# Patient Record
Sex: MALE | Race: WHITE | Employment: UNEMPLOYED | URBAN - METROPOLITAN AREA
[De-identification: names, ages, dates, MRNs, and addresses within clinical notes are randomized per-mention and may not be internally consistent; named-entity substitution may affect disease eponyms.]

---
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
## 2021-03-30 ENCOUNTER — IMMUNIZATION (OUTPATIENT)
Dept: PRIMARY CARE CLINIC | Age: 21
End: 2021-03-30
Payer: COMMERCIAL

## 2021-03-30 PROCEDURE — 91300 COVID-19, PFIZER VACCINE 30MCG/0.3ML DOSE: CPT | Performed by: FAMILY MEDICINE

## 2021-03-30 PROCEDURE — 0001A COVID-19, PFIZER VACCINE 30MCG/0.3ML DOSE: CPT | Performed by: FAMILY MEDICINE

## 2021-04-16 ENCOUNTER — HOSPITAL ENCOUNTER (OUTPATIENT)
Age: 21
Discharge: HOME | End: 2021-04-16
Payer: COMMERCIAL

## 2021-04-16 PROCEDURE — 82941 ASSAY OF GASTRIN: CPT

## 2021-04-20 ENCOUNTER — IMMUNIZATION (OUTPATIENT)
Dept: PRIMARY CARE CLINIC | Age: 21
End: 2021-04-20
Payer: COMMERCIAL

## 2021-04-20 PROCEDURE — 91300 COVID-19, PFIZER VACCINE 30MCG/0.3ML DOSE: CPT | Performed by: FAMILY MEDICINE

## 2021-04-20 PROCEDURE — 0002A COVID-19, PFIZER VACCINE 30MCG/0.3ML DOSE: CPT | Performed by: FAMILY MEDICINE

## 2021-04-21 LAB — GASTRIN: 45 PG/ML (ref 0–100)

## 2022-02-22 ENCOUNTER — HOSPITAL ENCOUNTER (OUTPATIENT)
Dept: PHYSICAL THERAPY | Age: 22
Setting detail: THERAPIES SERIES
Discharge: HOME | End: 2022-02-22
Payer: COMMERCIAL

## 2022-02-22 PROCEDURE — 97161 PT EVAL LOW COMPLEX 20 MIN: CPT | Performed by: PHYSICAL THERAPIST

## 2022-02-22 PROCEDURE — 97110 THERAPEUTIC EXERCISES: CPT | Performed by: PHYSICAL THERAPIST

## 2022-02-22 PROCEDURE — 97112 NEUROMUSCULAR REEDUCATION: CPT | Performed by: PHYSICAL THERAPIST

## 2022-02-22 NOTE — PLAN OF CARE
[address],  [facility] [address] [facility], [address] [facility] ([facility] [address]  Phone: [phone], [phone]                                                    Physical Therapy Certification    Dear Referring Practitioner: Dr. Gila San,    We had the pleasure of evaluating the following patient for physical therapy services at [address] [place] [address]. A summary of our findings can be found in the initial assessment below. This includes our plan of care. If you have any questions or concerns regarding these findings, please do not hesitate to contact me at the office phone number checked above. Thank you for the referral.       Physician Signature:_______________________________Date:__________________  By signing above (or electronic signature), therapists plan is approved by physician      Patient: Linda Brasher   : 2000   MRN: [id number]  Referring Physician: Referring Practitioner: Dr. Gila San      Evaluation Date: 2022      Medical Diagnosis Information:  Diagnosis: Left Shoulder Labrum Repair / Replissage   Treatment Diagnosis: S43.00D                                         Insurance information: PT Insurance Information: Camilla    Precautions/ Contra-indications/Relevant Medical History: Hx of left shoulder labral repair in 2019 / Hx of seizures. C-SSRS Triggered by Intake questionnaire (Past 2 wk assessment):   [x] No, Questionnaire did not trigger screening.   [] Yes, Patient intake triggered further evaluation      [] C-SSRS Screening completed  [] PCP notified via Plan of Care  [] Emergency services notified     Latex Allergy:  [x]NO      []YES   Preferred Language for Healthcare:   [x]English       []other:    SUBJECTIVE: Patient stated complaint: Patient is s/p labral repair of his left shoulder by Dr. Gila San on 22.      Functional Disability Index: Quick Dash/Modified Oswestry: 55% (Total Number Sum: 35)    Pain Scale: 3/10  Easing factors: rest, sling use, oxycodone, ice  Provocative factors: Sleeping, showering, grasping, getting dressed     Type: [x]Constant   []Intermittent  []Radiating []Localized []other:     Numbness/Tingling: None    Functional Limitations/Impairments: [x]Lifting/reaching []Grooming [x]Carrying    [x]ADL's []Driving []Sports/Recreations   []Other:    Occupation/School:     Living Status/Prior Level of Function: Independent with ADLs and IADLs    OBJECTIVE:     ROM Left Right   Shoulder Flex 90 °  WNL ? Shoulder Abd     Shoulder ER 15 °     Shoulder IR     Elbow Flex     Elbow Ext     Wrist Flex     Wrist Ext     Strength  Left Right   Shoulder Flex NT ?  4+/5   Shoulder Scap  4+/5   Shoulder ER  4+/5   Shoulder IR     Elbow Flex     Elbow Ext     Wrist Flex     Wrist Ext     [place]        Reflexes/Sensation (myotomes/dermatomes):    []Normal    []Abnormal:      Joint mobility:    [x]Normal    []Hypo   []Hyper    Palpation/Observation: incisions are open to air, no drainage. Functional Mobility/Transfers: ind w/ sling (4 weeks)    Posture: WNL    Bandages/Dressings/Incisions: see above. Gait (include devices/WB status): WNL    Orthopedic Special Tests: NT    [x] Patient history, allergies, meds reviewed. Medical chart reviewed. See intake form. Review Of Systems (ROS):  [x]Performed Review of systems (Integumentary, CardioPulmonary, Neurological) by intake and observation. Intake form has been scanned into medical record. Patient has been instructed to contact their primary care physician regarding ROS issues if not already being addressed at this time.       Co-morbidities/Complexities (which will affect course of rehabilitation):   [x]None           Arthritic conditions   []Rheumatoid arthritis (M05.9)  []Osteoarthritis (M19.91)   Cardiovascular conditions   []Hypertension (I10)  []Hyperlipidemia (E78.5)  []Angina pectoris (I20)  []Atherosclerosis (I70) Musculoskeletal conditions   []Disc pathology   []Congenital spine pathologies   []Prior surgical intervention  []Osteoporosis (M81.8)  []Osteopenia (M85.8)   Endocrine conditions   []Hypothyroid (E03.9)  []Hyperthyroid Gastrointestinal conditions   []Constipation (H17.24)   Metabolic conditions   []Morbid obesity (E66.01)  []Diabetes type 1(E10.65) or 2 (E11.65)   []Neuropathy (G60.9)     Pulmonary conditions   []Asthma (J45)  []Coughing   []COPD (J44.9)   Psychological Disorders  []Anxiety (F41.9)  []Depression (F32.9)   []Other:   [x]Other:  Hx of left sided labral tear/repair. Barriers to/and or personal factors that will affect rehab potential:              []Age  []Sex              []Motivation/Lack of Motivation                        []Co-Morbidities              []Cognitive Function, education/learning barriers              []Environmental, home barriers              []profession/work barriers  []past PT/medical experience  []other:  Justification:    Falls Risk Assessment (30 days):  [x] Falls Risk assessed and no intervention required.   [] Falls Risk assessed and Patient requires intervention due to being higher risk   TUG score (>12s at risk):     [] Falls education provided, including     ASSESSMENT:   Functional Impairments   []Noted spinal or UE joint hypomobility   []Noted spinal or UE joint hypermobility   [x]Decreased UE functional ROM   [x]Decreased UE functional strength   []Abnormal reflexes/sensation/myotomal/dermatomal deficits   [x]Decreased RC/scapular/core strength and neuromuscular control   []other:      Functional Activity Limitations (from functional questionnaire and intake)   []Reduced ability to tolerate prolonged functional positions   []Reduced ability or difficulty with changes of positions or transfers between positions   []Reduced ability to maintain good posture and demonstrate good body mechanics with sitting, bending, and lifting   [] Reduced ability or tolerance with driving and/or computer work   [x]Reduced ability to sleep   [x]Reduced ability to perform lifting, reaching, carrying tasks   [x]Reduced ability to tolerate impact through UE   [x]Reduced ability to reach behind back   [x]Reduced ability to  or hold objects   [x]Reduced ability to throw or toss an object   []other:    Participation Restrictions   []Reduced participation in self care activities   [x]Reduced participation in home management activities   [x]Reduced participation in work activities   [x]Reduced participation in social activities. []Reduced participation in sport/recreation activities. Classification:    [x]Signs/symptoms consistent with post-surgical status including decreased ROM, strength and function. []Signs/symptoms consistent with joint sprain/strain   []Signs/symptoms consistent with shoulder impingement   []Signs/symptoms consistent with shoulder/elbow/wrist tendinopathy   []Signs/symptoms consistent with Rotator cuff tear   []Signs/symptoms consistent with labral tear   []Signs/symptoms consistent with postural dysfunction    []Signs/symptoms consistent with Glenohumeral IR Deficit - <45 degrees   []Signs/symptoms consistent with facet dysfunction of cervical/thoracic spine    []Signs/symptoms consistent with pathology which may benefit from Dry needling     []other:      Tolerance of evaluation/treatment:    []Excellent   [x]Good    []Fair   []Poor    Physical Therapy Evaluation Complexity Justification  [x] A history of present problem with:  [] no personal factors and/or comorbidities that impact the plan of care;  [x]1-2 personal factors and/or comorbidities that impact the plan of care  []3 personal factors and/or comorbidities that impact the plan of care  [x] An examination of body systems using standardized tests and measures addressing any of the following: body structures and functions (impairments), activity limitations, and/or participation restrictions;:  [] a total of 1-2 or more elements   [x] a total of 3 or more elements   [] a total of 4 or more elements   [x] A clinical presentation with:  [x] stable and/or uncomplicated characteristics   [] evolving clinical presentation with changing characteristics  [] unstable and unpredictable characteristics;   [x] Clinical decision making of [x] low, [] moderate, [] high complexity using standardized patient assessment instrument and/or measurable assessment of functional outcome. [x] EVAL (LOW) 43873 (typically 20 minutes face-to-face)  [] EVAL (MOD) 05940 (typically 30 minutes face-to-face)  [] EVAL (HIGH) 03171 (typically 45 minutes face-to-face)  [] RE-EVAL     PLAN:  Frequency/Duration:  1-2 days per week for 12 weeks:  INTERVENTIONS:  [x]  Therapeutic exercise including: strength training, ROM, for upper extremity and core   [x]  NMR activation and proprioception for UE and Core   [x]  Manual therapy as indicated for UE and spine to include: Dry Needling/IASTM, STM, PROM, Gr I-IV mobilizations, manipulation. [x] Modalities as needed that may include: thermal agents, E-stim, Biofeedback, US, iontophoresis as indicated  [x] Patient education on joint protection, postural re-education, activity modification, progression of HEP. HEP instruction: Refer to [address] [place] [address] access code and exercises on the 1st visit treatment note    GOALS:    Short Term Goals: To be achieved in: 2 weeks  1. Independent in HEP and progression per patient tolerance, in order to prevent re-injury. [] Progressing: [] Met: [] Not Met: [] Adjusted   2. Patient will have a decrease in pain to facilitate improvement in movement, function, and ADLs as indicated by Functional Deficits. [] Progressing: [] Met: [] Not Met: [] Adjusted     Long Term Goals: To be achieved in: 12 weeks  1. Disability index score of 20% or less for the QuickDash/Oswestry to assist with reaching prior level of function. [] Progressing: [] Met: [] Not Met: [] Adjusted   2. Patient will demonstrate increased AROM to equal the opposite side bilaterally to allow for proper joint functioning as indicated by patients Functional Deficits. [] Progressing: [] Met: [] Not Met: [] Adjusted   3. Patient will demonstrate an increase in strength to match bilaterally or be within 3lbs on the HHD to allow for proper functional mobility as indicated by patients Functional Deficits. [] Progressing: [] Met: [] Not Met: [] Adjusted   4. Patient will return to all transfers, work activities, and functional activities without increased symptoms or restriction. [] Progressing: [] Met: [] Not Met: [] Adjusted   5. Patient will have 0/10 pain with ADL's.  [] Progressing: [] Met: [] Not Met: [] Adjusted   6.  Patient stated goal: Return to work full time with no restrictions ()  [] Progressing: [] Met: [] Not Met: [] Adjusted       Electronically signed by:  Renay Stark, PT

## 2022-02-22 NOTE — FLOWSHEET NOTE
[facility],  [address], [address] [facility] [address], [address] [facility] [place]  Phone: [phone], [phone]    Physical Therapy Treatment Note/ Progress Report:     Date:  2022    Patient Name:  Haylee Cline    :  2000  MRN: [id number]  Restrictions/Precautions:    Medical/Treatment Diagnosis Information:  · Diagnosis: Left Shoulder Labrum Repair / Replissage  · Treatment Diagnosis: H26.20I  Insurance/Certification information:  PT Insurance Information: Ruthann  Physician Information:  Referring Practitioner: Dr. Elena Newton  Has the plan of care been signed (Y/N):        []  Yes  [x]  No     Date of Patient follow up with Physician:     Is this a Progress Report:     []  Yes  [x]  No      If Yes:  Date Range for reporting period:  Initial Eval: 2022  Beginnin2022 --- Ending: 3/22/22    Progress report will be due (10 Rx or 30 days whichever is less): 0/10/71     Recertification will be due (POC Duration  / 90 days whichever is less): 22      Visit # Insurance Allowable Auth Required   In Person 1 30 []  Yes     []  No    [facility] 0  []  Yes     []  No    Total 1         Functional Scale: Quick Dash/Modified Oswestry: 55% (Total Number Sum: 35)   Date assessed: 2022     Latex Allergy:  [x]NO      []YES  Preferred Language for Healthcare:   [x]English       []other:    Pain level:  -3/10     SUBJECTIVE:  See eval    OBJECTIVE: See eval   Observation:    Test measurements:      RESTRICTIONS/PRECAUTIONS: Frequent hx of seizures     Guidelines:    \"Avoid putting arm behind back\"  \"Restrict IR up to belly with arm in adduction\"  \"Restrict IR to 30 degrees with arm in abduction\"    **Protocal in media file**  Week 1: Flexion: 90 ° / ER to 0 ° / IR to body at 0 ° of abd  Week 2: Flexion: 90 ° / ER to 30 ° / IR to body at 45 ° of abd  Week 3-4: Flexion: 130 ° / ER to 45 ° at 45 ° of abduction / IR to 65 ° at 45-90 ° of abduction  Week 5-6: Flexion: WNL / Abduction WNL / ER to 90/90 / IR to WNL    Exercises/Interventions:   Therapeutic Ex (84404)  Therapeutic Activity (77977)  NMR re-education (32857) Sets/Reps Notes/CUES   [place]                    Scap pinches / shrugs x15    Self passive flexion/ER x20 ea    Ball squeeze x30              Pendulums x20 ea Fwd/bwd  Side to side                                                                         Manual Intervention (90472)                                   Medbridge access code: [facility]                    Patient Education 10 min (5 to [place] / 5 with dad) Provided instruction in shoulder safe zone priniciples. Therapeutic Exercise and NMR EXR  [x] (40774) Provided verbal/tactile cueing for activities related to strengthening, flexibility, endurance, ROM  for improvements in scapular, scapulothoracic and UE control with self care, reaching, carrying, lifting, house/yardwork, driving/computer work. [x] (48295) Provided verbal/tactile cueing for activities related to improving balance, coordination, kinesthetic sense, posture, motor skill, proprioception  to assist with  scapular, scapulothoracic and UE control with self care, reaching, carrying, lifting, house/yardwork, driving/computer work. Therapeutic Activities:    [x] (28432 or 25270) Provided verbal/tactile cueing for activities related to improving balance, coordination, kinesthetic sense, posture, motor skill, proprioception and motor activation to allow for proper function of scapular, scapulothoracic and UE control with self care, carrying, lifting, driving/computer work.      Home Exercise Program:    [x] (40748) Reviewed/Progressed HEP activities related to strengthening, flexibility, endurance, ROM of scapular, scapulothoracic and UE control with self care, reaching, carrying, lifting, house/yardwork, driving/computer work  [x] (41417) Reviewed/Progressed HEP activities related to improving balance, coordination, kinesthetic sense, posture, motor skill, proprioception of scapular, scapulothoracic and UE control with self care, reaching, carrying, lifting, house/yardwork, driving/computer work      Manual Treatments:  PROM / STM / Oscillations-Mobs:  G-I, II, III, IV (Alanna, Inf., Post.)  [x] (46612) Provided manual therapy to mobilize soft tissue/joints of cervical/CT, scapular GHJ and UE for the purpose of modulating pain, promoting relaxation,  increasing ROM, reducing/eliminating soft tissue swelling/inflammation/restriction, improving soft tissue extensibility and allowing for proper ROM for normal function with self care, reaching, carrying, lifting, house/yardwork, driving/computer work    Modalities:      Charges:  Timed Code Treatment Minutes: 30   Total Treatment Minutes:  50   BWC:  TE TIME:  NMR TIME:  MANUAL TIME:  UNTIMED MINUTES:  Medicare Total:                 [x] EVAL (LOW) 04122 (typically 20 minutes face-to-face)  [] EVAL (MOD) 95421 (typically 30 minutes face-to-face)  [] EVAL (HIGH) 85452 (typically 45 minutes face-to-face)  [] RE-EVAL     [x] KT(68634) x 1    [] IONTO  [x] NMR (31789) x 1     [] VASO  [] Manual (59717) x     [] Other:  [] TA x      [] Mech Traction (47643)  [] ES(attended) (93199)     [] ES (un) (35067):    ASSESSMENT:  See eval    GOALS:   Short Term Goals: To be achieved in: 2 weeks  1. Independent in HEP and progression per patient tolerance, in order to prevent re-injury. []? Progressing: []? Met: []? Not Met: []? Adjusted       2. Patient will have a decrease in pain to facilitate improvement in movement, function, and ADLs as indicated by Functional Deficits. []? Progressing: []? Met: []? Not Met: []? Adjusted          Long Term Goals: To be achieved in: 12 weeks  1. Disability index score of 20% or less for the QuickDash/Oswestry to assist with reaching prior level of function. []? Progressing: []? Met: []? Not Met: []? Adjusted      2.  Patient will demonstrate increased AROM to equal the opposite side bilaterally to allow for proper joint functioning as indicated by patients Functional Deficits. []? Progressing: []? Met: []? Not Met: []? Adjusted       3. Patient will demonstrate an increase in strength to match bilaterally or be within 3lbs on the HHD to allow for proper functional mobility as indicated by patients Functional Deficits. []? Progressing: []? Met: []? Not Met: []? Adjusted       4. Patient will return to all transfers, work activities, and functional activities without increased symptoms or restriction. []? Progressing: []? Met: []? Not Met: []? Adjusted       5. Patient will have 0/10 pain with ADL's.  []? Progressing: []? Met: []? Not Met: []? Adjusted       6. Patient stated goal: Return to work full time with no restrictions ()  []? Progressing: []? Met: []? Not Met: []? Adjusted          Overall Progression Towards Functional goals/ Treatment Progress Update:  [] Patient is progressing as expected towards functional goals listed. [] Progression is slowed due to complexities/Impairments listed. [] Progression has been slowed due to co-morbidities.   [x] Plan just implemented, too soon to assess goals progression <30days   [] Goals require adjustment due to lack of progress  [] Patient is not progressing as expected and requires additional follow up with physician  [] Other    Prognosis for POC: [x] Good [] Fair  [] Poor    Patient requires continued skilled intervention: [x] Yes  [] No    Treatment/Activity Tolerance:  [x] Patient able to complete treatment  [] Patient limited by fatigue  [] Patient limited by pain    [] Patient limited by other medical complications  [] Other:     Return to Play: (if applicable)   []  Stage 1: Intro to Strength   []  Stage 2: Return to Run and Strength   []  Stage 3: Return to Jump and Strength   []  Stage 4: Dynamic Strength and Agility   []  Stage 5: Sport Specific Training     []  Ready to Return to Play, Meets All Above Stages   []  Not Ready for Return to Sports   Comments:                         PLAN: See eval  [] Continue per plan of care [] Alter current plan (see comments above)  [x] Plan of care initiated [] Hold pending MD visit [] Discharge    Electronically signed by:  Teresita Nance PT    Note: If patient does not return for scheduled/ recommended follow up visits, this note will serve as a discharge from care along with most recent update on progress.

## 2022-03-03 ENCOUNTER — HOSPITAL ENCOUNTER (OUTPATIENT)
Dept: PHYSICAL THERAPY | Age: 22
Setting detail: THERAPIES SERIES
Discharge: HOME | End: 2022-03-03
Payer: COMMERCIAL

## 2022-03-03 PROCEDURE — 97140 MANUAL THERAPY 1/> REGIONS: CPT | Performed by: PHYSICAL THERAPIST

## 2022-03-03 PROCEDURE — 97112 NEUROMUSCULAR REEDUCATION: CPT | Performed by: PHYSICAL THERAPIST

## 2022-03-03 PROCEDURE — 97110 THERAPEUTIC EXERCISES: CPT | Performed by: PHYSICAL THERAPIST

## 2022-03-03 NOTE — FLOWSHEET NOTE
[address]23 [facility] and [address] [place] [address], [address] [place] [address] 904 [facility], [address] [place] [address]  Phone: [phone], [phone]    Physical Therapy Treatment Note/ Progress Report:     Date:  3/3/2022    Patient Name:  Mikki Flaherty    :  2000  MRN: [id number]  Restrictions/Precautions:    Medical/Treatment Diagnosis Information:  · Diagnosis: Left Shoulder Labrum Repair / Replissage (22)  · Treatment Diagnosis: Z18.85P  Insurance/Certification information:  PT Insurance Information: Ruthann  Physician Information:  Referring Practitioner: Dr. Bard Huffman  Has the plan of care been signed (Y/N):        []  Yes  [x]  No     Date of Patient follow up with Physician:     Is this a Progress Report:     []  Yes  [x]  No      If Yes:  Date Range for reporting period:  Initial Eval: 2022  Beginnin2022 --- Ending: 3/22/22    Progress report will be due (10 Rx or 30 days whichever is less):      Recertification will be due (POC Duration  / 90 days whichever is less): 22      Visit # Insurance Allowable Auth Required   In Person 2 30 (does not need auth) []  Yes     []  No    [facility] 0  []  Yes     []  No    Total 2       Functional Scale: Quick Dash/Modified Oswestry: 55% (Total Number Sum: 35)   Date assessed: 2022     Latex Allergy:  [x]NO      []YES  Preferred Language for Healthcare:   [x]English       []other:    Pain level:  1-5/10     SUBJECTIVE:  (Patient is 2 weeks post op on 3/3/22) \"still been having a lot of pain and agitation\". States he's been sleeping pretty decent so far. OBJECTIVE: See eval   Observation:    Test measurements:      Current shoulder flexion: 90 ° (open end feel) / ER: 30 ° (open end feel). RESTRICTIONS/PRECAUTIONS: Frequent hx of seizures     Guidelines:    \"Avoid putting arm behind back\"  \"Restrict IR up to belly with arm in adduction\"  \"Restrict IR to 30 degrees with arm in abduction\"    **Protocal in media file**    Week 2: Flexion: 90 ° / ER to 30 ° / IR to body at 45 ° of abd  Week 3-4: Flexion: 130 ° / ER to 45 ° at 45 ° of abduction / IR to 65 ° at 45-90 ° of abduction  Week 5-6: Flexion: WNL / Abduction WNL / ER to 90/90 / IR to WNL    EXERCISES FOR WEEKS 3-6 ? Exercises/Interventions:   Therapeutic Ex (24282)  Therapeutic Activity (94915)  NMR re-education (31508) Sets/Reps Notes/CUES   [place]                    Scap pinches / shrugs x15    Self passive flexion/ER x20 ea    Ball squeeze x30              Pendulums x20 ea Fwd/bwd  Side to side        Supine cane press  Supine cane flexion x15  NV    Supine cane ER 10x10\"    SLER x15                   TB rows/ext/triceps x20 ea  Red TB                                                Manual Intervention (20675)     PROM / Gabby Dalia / PNF @ 90 ° of flex 15 min                             Access Code [email or web address]                    Patient Education 10 min (5 to [place] / 5 with dad) Provided instruction in shoulder safe zone priniciples. Therapeutic Exercise and NMR EXR  [x] (19439) Provided verbal/tactile cueing for activities related to strengthening, flexibility, endurance, ROM  for improvements in scapular, scapulothoracic and UE control with self care, reaching, carrying, lifting, house/yardwork, driving/computer work. [x] (45741) Provided verbal/tactile cueing for activities related to improving balance, coordination, kinesthetic sense, posture, motor skill, proprioception  to assist with  scapular, scapulothoracic and UE control with self care, reaching, carrying, lifting, house/yardwork, driving/computer work. Therapeutic Activities:    [x] (52388 or 27313) Provided verbal/tactile cueing for activities related to improving balance, coordination, kinesthetic sense, posture, motor skill, proprioception and motor activation to allow for proper function of scapular, scapulothoracic and UE control with self care, carrying, lifting, driving/computer work. Home Exercise Program:    [x] (45717) Reviewed/Progressed HEP activities related to strengthening, flexibility, endurance, ROM of scapular, scapulothoracic and UE control with self care, reaching, carrying, lifting, house/yardwork, driving/computer work  [x] (85061) Reviewed/Progressed HEP activities related to improving balance, coordination, kinesthetic sense, posture, motor skill, proprioception of scapular, scapulothoracic and UE control with self care, reaching, carrying, lifting, house/yardwork, driving/computer work      Manual Treatments:  PROM / STM / Oscillations-Mobs:  G-I, II, III, IV (PA's, Inf., Post.)  [x] (43091) Provided manual therapy to mobilize soft tissue/joints of cervical/CT, scapular GHJ and UE for the purpose of modulating pain, promoting relaxation,  increasing ROM, reducing/eliminating soft tissue swelling/inflammation/restriction, improving soft tissue extensibility and allowing for proper ROM for normal function with self care, reaching, carrying, lifting, house/yardwork, driving/computer work    Modalities:      Charges:  Timed Code Treatment Minutes: 47   Total Treatment Minutes:  47   BWC:  TE TIME:  NMR TIME:  MANUAL TIME:  UNTIMED MINUTES:  Medicare Total:                 [] EVAL (LOW) 25905 (typically 20 minutes face-to-face)  [] EVAL (MOD) 64640 (typically 30 minutes face-to-face)  [] EVAL (HIGH) 60219 (typically 45 minutes face-to-face)  [] RE-EVAL     [x] KX(86362) x 1    [] IONTO  [x] NMR (67827) x 1     [] VASO  [x] Manual (97865) x 1    [] Other:  [] TA x      [] Mech Traction (61086)  [] ES(attended) (27825)     [] ES (un) (06175):    ASSESSMENT:  See eval    GOALS:   Short Term Goals: To be achieved in: 2 weeks  1. Independent in HEP and progression per patient tolerance, in order to prevent re-injury. []? Progressing: []? Met: []? Not Met: []? Adjusted       2.  Patient will have a decrease in pain to facilitate improvement in movement, function, and ADLs as indicated by Functional Deficits. []? Progressing: []? Met: []? Not Met: []? Adjusted          Long Term Goals: To be achieved in: 12 weeks  1. Disability index score of 20% or less for the QuickDash/Oswestry to assist with reaching prior level of function. []? Progressing: []? Met: []? Not Met: []? Adjusted      2. Patient will demonstrate increased AROM to equal the opposite side bilaterally to allow for proper joint functioning as indicated by patients Functional Deficits. []? Progressing: []? Met: []? Not Met: []? Adjusted       3. Patient will demonstrate an increase in strength to match bilaterally or be within 3lbs on the HHD to allow for proper functional mobility as indicated by patients Functional Deficits. []? Progressing: []? Met: []? Not Met: []? Adjusted       4. Patient will return to all transfers, work activities, and functional activities without increased symptoms or restriction. []? Progressing: []? Met: []? Not Met: []? Adjusted       5. Patient will have 0/10 pain with ADL's.  []? Progressing: []? Met: []? Not Met: []? Adjusted       6. Patient stated goal: Return to work full time with no restrictions ()  []? Progressing: []? Met: []? Not Met: []? Adjusted          Overall Progression Towards Functional goals/ Treatment Progress Update:  [] Patient is progressing as expected towards functional goals listed. [] Progression is slowed due to complexities/Impairments listed. [] Progression has been slowed due to co-morbidities.   [x] Plan just implemented, too soon to assess goals progression <30days   [] Goals require adjustment due to lack of progress  [] Patient is not progressing as expected and requires additional follow up with physician  [] Other    Prognosis for POC: [x] Good [] Fair  [] Poor    Patient requires continued skilled intervention: [x] Yes  [] No    Treatment/Activity Tolerance:  [x] Patient able to complete treatment  [] Patient limited by fatigue  [] Patient limited by pain    [] Patient limited by other medical complications  [] Other:     Return to Play: (if applicable)   []  Stage 1: Intro to Strength   []  Stage 2: Return to Run and Strength   []  Stage 3: Return to Jump and Strength   []  Stage 4: Dynamic Strength and Agility   []  Stage 5: Sport Specific Training     []  Ready to Return to Play, Meets All Above Stages   []  Not Ready for Return to Sports   Comments:                         PLAN: See eval  [x] Continue per plan of care [] Alter current plan (see comments above)  [] Plan of care initiated [] Hold pending MD visit [] Discharge    Electronically signed by:  Cherelle Glover PT    Note: If patient does not return for scheduled/ recommended follow up visits, this note will serve as a discharge from care along with most recent update on progress.

## 2022-03-10 ENCOUNTER — HOSPITAL ENCOUNTER (OUTPATIENT)
Dept: PHYSICAL THERAPY | Age: 22
Setting detail: THERAPIES SERIES
Discharge: HOME | End: 2022-03-10
Payer: COMMERCIAL

## 2022-03-10 NOTE — FLOWSHEET NOTE
[facility], [facility])    Physical Therapy  Cancellation/No-show Note  Patient Name:  Naldo Carlson  :  2000   Date:  3/10/2022    Cancelled visits to date: 1  No-shows to date: 0    For today's appointment patient:  [x]  Cancelled  []  Rescheduled appointment  []  No-show     Reason given by patient:  [x]  Patient ill  []  Conflicting appointment  []  No transportation    []  Conflict with work  []  No reason given  []  Other:     Comments:      Phone call information:   []  Phone call made today to patient. []  Patient answered, conversation as follows:    []  Patient did not answer. [x]  Phone call not needed - pt contacted us to cancel and provided reason for cancellation.      Electronically signed by:  Reddy Sun PT,

## 2022-03-17 ENCOUNTER — HOSPITAL ENCOUNTER (OUTPATIENT)
Dept: PHYSICAL THERAPY | Age: 22
Setting detail: THERAPIES SERIES
End: 2022-03-17
Payer: COMMERCIAL

## 2022-03-24 ENCOUNTER — APPOINTMENT (OUTPATIENT)
Dept: PHYSICAL THERAPY | Age: 22
End: 2022-03-24
Payer: COMMERCIAL

## 2022-03-29 ENCOUNTER — HOSPITAL ENCOUNTER (OUTPATIENT)
Dept: PHYSICAL THERAPY | Age: 22
Setting detail: THERAPIES SERIES
End: 2022-03-29
Payer: COMMERCIAL

## 2022-04-07 ENCOUNTER — HOSPITAL ENCOUNTER (OUTPATIENT)
Dept: PHYSICAL THERAPY | Age: 22
Setting detail: THERAPIES SERIES
Discharge: HOME | End: 2022-04-07
Payer: COMMERCIAL

## 2022-04-07 PROCEDURE — 97112 NEUROMUSCULAR REEDUCATION: CPT | Performed by: PHYSICAL THERAPIST

## 2022-04-07 PROCEDURE — 97110 THERAPEUTIC EXERCISES: CPT | Performed by: PHYSICAL THERAPIST

## 2022-04-07 PROCEDURE — 97140 MANUAL THERAPY 1/> REGIONS: CPT | Performed by: PHYSICAL THERAPIST

## 2022-04-07 NOTE — FLOWSHEET NOTE
[address] [facility] and [address] [place] [address] 904 [facility], [address] [place] [address]  Phone: [phone], [phone]    Physical Therapy Treatment Note/ Progress Report:     Date:  2022    Patient Name:  Preet Busch    :  2000  MRN: [id number]  Restrictions/Precautions:    Medical/Treatment Diagnosis Information:  · Diagnosis: Left Shoulder Labrum Repair / Replissage (22)  · Treatment Diagnosis: N44.82V  Insurance/Certification information:  PT Insurance Information: Ruthann  Physician Information:  Referring Practitioner: Dr. Hieu Kenney  Has the plan of care been signed (Y/N):        [x]  Yes  []  No     Date of Patient follow up with Physician: 22    Is this a Progress Report:     [x]  Yes  []  No      If Yes:  Date Range for reporting period:  Initial Eval: 2022  Beginnin2022 --- Endin22    Progress report will be due (10 Rx or 30 days whichever is less): 3/22/22 done visit 3 81    Recertification will be due (POC Duration  / 90 days whichever is less): 22      Visit # Insurance Allowable Auth Required   In Person 3 30 (does not need auth) []  Yes     [x]  No    Tele Health 0  []  Yes     []  No    Total 3       Functional Scale: Quick Dash/Modified Oswestry: 55% (Total Number Sum: 35)   Date assessed: 2022     Latex Allergy:  [x]NO      []YES  Preferred Language for Healthcare:   [x]English       []other:    Pain level:  0-1/10     SUBJECTIVE:  (Patient is 7 weeks post op on 22)   He reports that he has seen the doctor and he is allowed to not wear sling and progress. Pt reports that he hopes to go back to work end of month light duty ([employer], he is a ). He says he arm is still sore but moving better.        OBJECTIVE:    Observation: overall hypomobile GH mobs   Test measurements:   AROM SFLX 150  SABD 130    SER @ side 50   SIR behind back T10   MMT  FLX/IR/ER 4 to 4+/5 RESTRICTIONS/PRECAUTIONS: Frequent hx of seizures     Guidelines: \"Avoid putting arm behind back\"  \"Restrict IR up to belly with arm in adduction\"  \"Restrict IR to 30 degrees with arm in abduction\"    **Protocal in media file**    Week 2: Flexion: 90 ° / ER to 30 ° / IR to body at 45 ° of abd  Week 3-4: Flexion: 130 ° / ER to 45 ° at 45 ° of abduction / IR to 65 ° at 45-90 ° of abduction  Week 5-6: Flexion: WNL / Abduction WNL / ER to 90/90 / IR to WNL    EXERCISES FOR WEEKS 3-6 ? Exercises/Interventions:   Therapeutic Ex (14987)  NMR re-education (19943) Sets/Reps Notes/CUES        Supine cane scaption 10x10\"    Supine cane ER 10x10\"    TB rows w scap set GTB 20x3\" Difficulty w scap set   TB ext w scap set GTB 20x3\" difficutly w scap set        Wall slides BUE scaption/flexion 10x ea + HEP 4/7/22                                                                              reassessment x6'    Pt ed x5' Review and update HEP, progression and expectations                  Manual Intervention (87069)          GI-II distraction/oscillation/xcircumduction for relaxation and pain control x8'    PROM IR/ER/FLX 8'                   Access Code [email or web address]                    Patient Education             Therapeutic Exercise and NMR EXR  [x] (73442) Provided verbal/tactile cueing for activities related to strengthening, flexibility, endurance, ROM  for improvements in scapular, scapulothoracic and UE control with self care, reaching, carrying, lifting, house/yardwork, driving/computer work. [x] (50696) Provided verbal/tactile cueing for activities related to improving balance, coordination, kinesthetic sense, posture, motor skill, proprioception  to assist with  scapular, scapulothoracic and UE control with self care, reaching, carrying, lifting, house/yardwork, driving/computer work.     Therapeutic Activities:    [] (57310 or 61254) Provided verbal/tactile cueing for activities related to improving balance, coordination, kinesthetic sense, posture, motor skill, proprioception and motor activation to allow for proper function of scapular, scapulothoracic and UE control with self care, carrying, lifting, driving/computer work. Home Exercise Program:    [x] (21540) Reviewed/Progressed HEP activities related to strengthening, flexibility, endurance, ROM of scapular, scapulothoracic and UE control with self care, reaching, carrying, lifting, house/yardwork, driving/computer work  [x] (27780) Reviewed/Progressed HEP activities related to improving balance, coordination, kinesthetic sense, posture, motor skill, proprioception of scapular, scapulothoracic and UE control with self care, reaching, carrying, lifting, house/yardwork, driving/computer work      Manual Treatments:  PROM / STM / Oscillations-Mobs:  G-I, II, III, IV (PA's, Inf., Post.)  [x] (60150) Provided manual therapy to mobilize soft tissue/joints of cervical/CT, scapular GHJ and UE for the purpose of modulating pain, promoting relaxation,  increasing ROM, reducing/eliminating soft tissue swelling/inflammation/restriction, improving soft tissue extensibility and allowing for proper ROM for normal function with self care, reaching, carrying, lifting, house/yardwork, driving/computer work    Modalities:      Charges:  Timed Code Treatment Minutes: 50   Total Treatment Minutes:  50   BWC:  TE TIME:  NMR TIME:  MANUAL TIME:  UNTIMED MINUTES:  Medicare Total:                 [] EVAL (LOW) 99216 (typically 20 minutes face-to-face)  [] EVAL (MOD) 15441 (typically 30 minutes face-to-face)  [] EVAL (HIGH) 78660 (typically 45 minutes face-to-face)  [] RE-EVAL     [x] ET(82578) x 1    [] IONTO  [x] NMR (86184) x 1     [] VASO  [x] Manual (01836) x 1    [] Other:  [] TA x      [] Mech Traction (35222)  [] ES(attended) (76446)     [] ES (un) (14727):    ASSESSMENT:    Pt tolerated session well. He had not been in due to being ill. Improved ROM and strength this visit. Patient will benefit from continued skilled intervention to increase ROM, flexibility, strength and function. GOALS:   Short Term Goals: To be achieved in: 2 weeks  1. Independent in HEP and progression per patient tolerance, in order to prevent re-injury. [x]? Progressing: []? Met: []? Not Met: []? Adjusted       2. Patient will have a decrease in pain to facilitate improvement in movement, function, and ADLs as indicated by Functional Deficits. [x]? Progressing: []? Met: []? Not Met: []? Adjusted          Long Term Goals: To be achieved in: 12 weeks  1. Disability index score of 20% or less for the QuickDash/Oswestry to assist with reaching prior level of function. []? Progressing: []? Met: []? Not Met: []? Adjusted      2. Patient will demonstrate increased AROM to equal the opposite side bilaterally to allow for proper joint functioning as indicated by patients Functional Deficits. []? Progressing: []? Met: []? Not Met: []? Adjusted       3. Patient will demonstrate an increase in strength to match bilaterally or be within 3lbs on the HHD to allow for proper functional mobility as indicated by patients Functional Deficits. []? Progressing: []? Met: []? Not Met: []? Adjusted       4. Patient will return to all transfers, work activities, and functional activities without increased symptoms or restriction. []? Progressing: []? Met: []? Not Met: []? Adjusted       5. Patient will have 0/10 pain with ADL's.  []? Progressing: []? Met: []? Not Met: []? Adjusted       6. Patient stated goal: Return to work full time with no restrictions ()  []? Progressing: []? Met: []? Not Met: []? Adjusted          Overall Progression Towards Functional goals/ Treatment Progress Update:  [x] Patient is progressing as expected towards functional goals listed. [] Progression is slowed due to complexities/Impairments listed. [] Progression has been slowed due to co-morbidities.   [] Plan just implemented, too soon to assess goals progression <30days   [] Goals require adjustment due to lack of progress  [] Patient is not progressing as expected and requires additional follow up with physician  [] Other    Prognosis for POC: [x] Good [] Fair  [] Poor    Patient requires continued skilled intervention: [x] Yes  [] No    Treatment/Activity Tolerance:  [x] Patient able to complete treatment  [] Patient limited by fatigue  [] Patient limited by pain    [] Patient limited by other medical complications  [] Other:                    PLAN: Cont x4 weeks for increased strength, ROM and function. [x] Continue per plan of care [] Alter current plan (see comments above)  [] Plan of care initiated [] Hold pending MD visit [] Discharge    Electronically signed by:  Valentino Rajan, PT [id number]  Note: If patient does not return for scheduled/ recommended follow up visits, this note will serve as a discharge from care along with most recent update on progress.

## 2022-04-07 NOTE — PROGRESS NOTES
RESTRICTIONS/PRECAUTIONS: Frequent hx of seizures       ASSESSMENT:    Pt tolerated session well. He had not been in due to being ill. Improved ROM and strength this visit. Patient will benefit from continued skilled intervention to increase ROM, flexibility, strength and function. GOALS:   Short Term Goals: To be achieved in: 2 weeks  1. Independent in HEP and progression per patient tolerance, in order to prevent re-injury. [x]? Progressing: []? Met: []? Not Met: []? Adjusted       2. Patient will have a decrease in pain to facilitate improvement in movement, function, and ADLs as indicated by Functional Deficits. [x]? Progressing: []? Met: []? Not Met: []? Adjusted          Long Term Goals: To be achieved in: 12 weeks  1. Disability index score of 20% or less for the QuickDash/Oswestry to assist with reaching prior level of function. []? Progressing: []? Met: []? Not Met: []? Adjusted      2. Patient will demonstrate increased AROM to equal the opposite side bilaterally to allow for proper joint functioning as indicated by patients Functional Deficits. []? Progressing: []? Met: []? Not Met: []? Adjusted       3. Patient will demonstrate an increase in strength to match bilaterally or be within 3lbs on the HHD to allow for proper functional mobility as indicated by patients Functional Deficits. []? Progressing: []? Met: []? Not Met: []? Adjusted       4. Patient will return to all transfers, work activities, and functional activities without increased symptoms or restriction. []? Progressing: []? Met: []? Not Met: []? Adjusted       5. Patient will have 0/10 pain with ADL's.  []? Progressing: []? Met: []? Not Met: []? Adjusted       6. Patient stated goal: Return to work full time with no restrictions ()  []? Progressing: []? Met: []? Not Met: []?  Adjusted          Overall Progression Towards Functional goals/ Treatment Progress Update:  [x] Patient is progressing as expected towards functional goals listed. [] Progression is slowed due to complexities/Impairments listed. [] Progression has been slowed due to co-morbidities. [] Plan just implemented, too soon to assess goals progression <30days   [] Goals require adjustment due to lack of progress  [] Patient is not progressing as expected and requires additional follow up with physician  [] Other    Prognosis for POC: [x] Good [] Fair  [] Poor    Patient requires continued skilled intervention: [x] Yes  [] No    Treatment/Activity Tolerance:  [x] Patient able to complete treatment  [] Patient limited by fatigue  [] Patient limited by pain    [] Patient limited by other medical complications  [] Other:                    PLAN: Cont x4 weeks for increased strength, ROM and function. [x] Continue per plan of care [] Alter current plan (see comments above)  [] Plan of care initiated [] Hold pending MD visit [] Discharge    Electronically signed by:  Yun Evans, PT [id number]  Note: If patient does not return for scheduled/ recommended follow up visits, this note will serve as a discharge from care along with most recent update on progress.

## 2022-04-12 ENCOUNTER — HOSPITAL ENCOUNTER (OUTPATIENT)
Dept: PHYSICAL THERAPY | Age: 22
Setting detail: THERAPIES SERIES
End: 2022-04-12
Payer: COMMERCIAL

## 2022-04-14 ENCOUNTER — HOSPITAL ENCOUNTER (OUTPATIENT)
Dept: PHYSICAL THERAPY | Age: 22
Setting detail: THERAPIES SERIES
Discharge: HOME | End: 2022-04-14
Payer: COMMERCIAL

## 2022-04-14 PROCEDURE — 97112 NEUROMUSCULAR REEDUCATION: CPT | Performed by: PHYSICAL THERAPIST

## 2022-04-14 PROCEDURE — 97110 THERAPEUTIC EXERCISES: CPT | Performed by: PHYSICAL THERAPIST

## 2022-04-14 PROCEDURE — 97140 MANUAL THERAPY 1/> REGIONS: CPT | Performed by: PHYSICAL THERAPIST

## 2022-04-14 NOTE — FLOWSHEET NOTE
723 [facility] and 500 [facility], [address] [place] [address] 904 [facility], [address] [place] [address] ([facility]), [address] [place] [address]  Phone: [phone], [phone]    Physical Therapy Treatment Note/ Progress Report:     Date:  2022    Patient Name:  Ximena Puente    :  2000  MRN: [id number]  Restrictions/Precautions:    Medical/Treatment Diagnosis Information:  · Diagnosis: Left Shoulder Labrum Repair / Replissage (22)  · Treatment Diagnosis: F20.75O  Insurance/Certification information:  PT Insurance Information: Ruthann  Physician Information:  Referring Practitioner: Dr. Ann Presume  Has the plan of care been signed (Y/N):        [x]  Yes  []  No     Date of Patient follow up with Physician: 22    Is this a Progress Report:     [x]  Yes  []  No      If Yes:  Date Range for reporting period:  Initial Eval: 2022  Beginnin2022 --- Endin22  Beginnin2022 ---- Endin22    Progress report will be due (10 Rx or 30 days whichever is less): 89    Recertification will be due (POC Duration  / 90 days whichever is less): 22      Visit # Insurance Allowable Auth Required   In Person 4 30 (does not need auth) []  Yes     [x]  No    Tele Health 0  []  Yes     []  No    Total 4       Functional Scale: Quick Dash/Modified Oswestry: 55% (Total Number Sum: 35)   Date assessed: 2022   Functional Scale: Quick Dash/Modified Oswestry: 35%       Date assessed: 2022    Latex Allergy:  [x]NO      []YES  Preferred Language for Healthcare:   [x]English       []other:    Pain level:  0-1/10     SUBJECTIVE:  (Patient is 8 weeks post op on 22) \"I'm moving a lot better, but still weak\"    OBJECTIVE:    Observation: overall hypomobile [address] mobs   Test measurements:   AROM SFLX 160  SABD 130    SER @ side 70   SIR behind back T10   MMT  FLX/IR/ER 4 to 4+/5       RESTRICTIONS/PRECAUTIONS: Frequent hx of seizures     **Protocal in media file**  EXERCISES FOR WEEKS 7-9 ?        Exercises/Interventions:   Therapeutic Ex (71827)  NMR re-education (15438) Sets/Reps Notes/CUES   Pulley 5 min    Supine cane scaption 10x10\" HEP   Supine cane ER  Supine shoulder flexion  Supine punch  Side lying ER  Side lying abd 10x10\"  1# x20  2# x20  1# x20  1# x20 HEP ? TB rows w scap set GTB 20x3\" Difficulty w scap set - HEP   TB ext w scap set GTB 20x3\" difficutly w scap set - HEP   TB ER/IR (NV) GTB x20 ea HEP   Wall slides BUE scaption/flexion 10x ea HEP        Wall push ups x15    Ball on wall x15 ea ??, ??, CW/CCW                                                                     Pt ed x5' Review and update HEP, progression and expectations. Provided instruction in shoulder safe zone priniciples. Manual Intervention (85175)          GI-II distraction/oscillation/xcircumduction for relaxation and pain control x8'    PROM IR/ER/FLX 8'                   Access Code [email or web address] HEP                    Patient Education             Therapeutic Exercise and NMR EXR  [x] (48243) Provided verbal/tactile cueing for activities related to strengthening, flexibility, endurance, ROM  for improvements in scapular, scapulothoracic and UE control with self care, reaching, carrying, lifting, house/yardwork, driving/computer work. [x] (33895) Provided verbal/tactile cueing for activities related to improving balance, coordination, kinesthetic sense, posture, motor skill, proprioception  to assist with  scapular, scapulothoracic and UE control with self care, reaching, carrying, lifting, house/yardwork, driving/computer work. Therapeutic Activities:    [] (47886 or 35859) Provided verbal/tactile cueing for activities related to improving balance, coordination, kinesthetic sense, posture, motor skill, proprioception and motor activation to allow for proper function of scapular, scapulothoracic and UE control with self care, carrying, lifting, driving/computer work.      Home Exercise Program:    [x] (23750) Reviewed/Progressed HEP activities related to strengthening, flexibility, endurance, ROM of scapular, scapulothoracic and UE control with self care, reaching, carrying, lifting, house/yardwork, driving/computer work  [x] (50710) Reviewed/Progressed HEP activities related to improving balance, coordination, kinesthetic sense, posture, motor skill, proprioception of scapular, scapulothoracic and UE control with self care, reaching, carrying, lifting, house/yardwork, driving/computer work      Manual Treatments:  PROM / STM / Oscillations-Mobs:  G-I, II, III, IV (PA's, Inf., Post.)  [x] (17857) Provided manual therapy to mobilize soft tissue/joints of cervical/CT, scapular GHJ and UE for the purpose of modulating pain, promoting relaxation,  increasing ROM, reducing/eliminating soft tissue swelling/inflammation/restriction, improving soft tissue extensibility and allowing for proper ROM for normal function with self care, reaching, carrying, lifting, house/yardwork, driving/computer work    Modalities:      Charges:  Timed Code Treatment Minutes: 55   Total Treatment Minutes:  55   BWC:  TE TIME:  NMR TIME:  MANUAL TIME:  UNTIMED MINUTES:  Medicare Total:                 [] EVAL (LOW) 06912 (typically 20 minutes face-to-face)  [] EVAL (MOD) 34459 (typically 30 minutes face-to-face)  [] EVAL (HIGH) 91914 (typically 45 minutes face-to-face)  [] RE-EVAL     [x] GT(60913) x 2    [] IONTO  [x] NMR (07567) x 1     [] VASO  [x] Manual (83703) x 1    [] Other:  [] TA x      [] Mech Traction (80000)  [] ES(attended) (76438)     [] ES (un) (40366):    ASSESSMENT:    Pt tolerated session well. He had not been in due to being ill. Improved ROM and strength this visit. Patient will benefit from continued skilled intervention to increase ROM, flexibility, strength and function. GOALS:   Short Term Goals: To be achieved in: 2 weeks  1.  Independent in HEP and progression per patient tolerance, in order to prevent re-injury. [x]? Progressing: []? Met: []? Not Met: []? Adjusted       2. Patient will have a decrease in pain to facilitate improvement in movement, function, and ADLs as indicated by Functional Deficits. [x]? Progressing: []? Met: []? Not Met: []? Adjusted          Long Term Goals: To be achieved in: 12 weeks  1. Disability index score of 20% or less for the QuickDash/Oswestry to assist with reaching prior level of function. []? Progressing: []? Met: []? Not Met: []? Adjusted      2. Patient will demonstrate increased AROM to equal the opposite side bilaterally to allow for proper joint functioning as indicated by patients Functional Deficits. []? Progressing: []? Met: []? Not Met: []? Adjusted       3. Patient will demonstrate an increase in strength to match bilaterally or be within 3lbs on the HHD to allow for proper functional mobility as indicated by patients Functional Deficits. []? Progressing: []? Met: []? Not Met: []? Adjusted       4. Patient will return to all transfers, work activities, and functional activities without increased symptoms or restriction. []? Progressing: []? Met: []? Not Met: []? Adjusted       5. Patient will have 0/10 pain with ADL's.  []? Progressing: []? Met: []? Not Met: []? Adjusted       6. Patient stated goal: Return to work full time with no restrictions ()  []? Progressing: []? Met: []? Not Met: []? Adjusted          Overall Progression Towards Functional goals/ Treatment Progress Update:  [x] Patient is progressing as expected towards functional goals listed. [] Progression is slowed due to complexities/Impairments listed. [] Progression has been slowed due to co-morbidities.   [] Plan just implemented, too soon to assess goals progression <30days   [] Goals require adjustment due to lack of progress  [] Patient is not progressing as expected and requires additional follow up with physician  [] Other    Prognosis for POC: [x] Good [] Fair  [] Poor    Patient requires continued skilled intervention: [x] Yes  [] No    Treatment/Activity Tolerance:  [x] Patient able to complete treatment  [] Patient limited by fatigue  [] Patient limited by pain    [] Patient limited by other medical complications  [] Other:                  PLAN: Cont x4 weeks for increased strength, ROM and function. [x] Continue per plan of care [] Alter current plan (see comments above)  [] Plan of care initiated [] Hold pending MD visit [] Discharge    Electronically signed by:  Eddie Bennett PT   Note: If patient does not return for scheduled/ recommended follow up visits, this note will serve as a discharge from care along with most recent update on progress.

## 2022-04-19 ENCOUNTER — HOSPITAL ENCOUNTER (OUTPATIENT)
Dept: PHYSICAL THERAPY | Age: 22
Setting detail: THERAPIES SERIES
Discharge: HOME | End: 2022-04-19
Payer: COMMERCIAL

## 2022-04-19 NOTE — FLOWSHEET NOTE
[address], [facility] ([facility])    Physical Therapy  Cancellation/No-show Note  Patient Name:  Akira Carias  :  2000   Date:  2022    Cancelled visits to date: 2  No-shows to date: 0    For today's appointment patient:  [x]  Cancelled  []  Rescheduled appointment  []  No-show     Reason given by patient:  [x]  Patient ill  []  Conflicting appointment  []  No transportation    []  Conflict with work  []  No reason given  []  Other:     Comments:      Phone call information:   []  Phone call made today to patient. []  Patient answered, conversation as follows:    []  Patient did not answer. [x]  Phone call not needed - pt contacted us to cancel and provided reason for cancellation.      Electronically signed by:  Ary Gee PT,

## 2022-04-21 ENCOUNTER — HOSPITAL ENCOUNTER (OUTPATIENT)
Dept: PHYSICAL THERAPY | Age: 22
Setting detail: THERAPIES SERIES
Discharge: HOME | End: 2022-04-21
Payer: COMMERCIAL

## 2022-04-21 PROCEDURE — 97140 MANUAL THERAPY 1/> REGIONS: CPT | Performed by: PHYSICAL THERAPY ASSISTANT

## 2022-04-21 PROCEDURE — 97112 NEUROMUSCULAR REEDUCATION: CPT | Performed by: PHYSICAL THERAPY ASSISTANT

## 2022-04-21 PROCEDURE — 97110 THERAPEUTIC EXERCISES: CPT | Performed by: PHYSICAL THERAPY ASSISTANT

## 2022-04-21 NOTE — FLOWSHEET NOTE
723 [facility] and 500 [address] 904 [facility], [address] [place], [place], [address] [place] [address]  Phone: [phone], [phone]  Date: 2022          Patient Name; :  Preet Busch; 2000   Dx/ICD Code:Diagnosis: Left Shoulder Labrum Repair / Replissage (22)  Treatment Diagnosis: S43.00D     Physician: Referring Practitioner: Dr. Hieu Kenney        Total PT Visits: 5     Measures Previous Current   Pain (0-10) 3/10 0/10   Disability % Quick Dash:  55%  Quick Dash: 35%   FOTO Score     ROM  PROM:  WNL        Strength                 Specific Functional Improvements & Impressions:  Gagan Fournier is making slow steady progress in therapy. He has no complaint of pain. His primary issue at this point is with continued weakness and decreased endurance in the shoulder. Gagan Fournier is anxious to return to work as a . His concern with this return is that at the beginning and end of his shift he must carry multiple items to and from his station. He feels this activity may cause him some pain and he may be too weak to carry what he needs. Plan & Recommendations:  [x] Continue rehabilitation due to objective improvement and continued functional deficits with frequency and duration: 1-2 times a week to progress strengthening. [] Progress toward  []GAP, []Work Conditioning, []Independent HEP   [] Discharge due to   [] All goals achieved, [] Maximized \"medical necessity\" [] No subjective or objective improvements      Electronically signed by:  Candice Huffman PTA / Jonh Torres, PT, DPT, OMT-C  [id number]      Therapy Plan of Care Re-Certification  This patient has been re-evaluated for physical therapy services and for therapy to continue, Medicare, Medicaid and other insurances require periodic physician review of the treatment plan.  Please review the above re-evaluation and verify that you agree with plan of care as established above by signing the attached document and return it to our office or note changes to established plan below  [] Follow treatment plan as above [] Discontinue physical therapy  [] Change plan to:                                 __________________________________________________    Physician Signature:____________________________________ Date:____________  By signing above, therapists plan is approved by physician    If you have any questions or concerns, please don't hesitate to call.   Thank you for your referral.    [facility] [address] office  ([phone])     Fax [phone]     Physical Therapy Treatment Note/ Progress Report:     Date:  2022    Patient Name:  Thalia Hanson    :  2000  MRN: [id number]  Restrictions/Precautions:    Medical/Treatment Diagnosis Information:  · Diagnosis: Left Shoulder Labrum Repair / Replissage (22)  · Treatment Diagnosis: E49.32D  Insurance/Certification information:  PT Insurance Information: Ruthann  Physician Information:  Referring Practitioner: Dr. Maria T Narayanan  Has the plan of care been signed (Y/N):        [x]  Yes  []  No     Date of Patient follow up with Physician: 22    Is this a Progress Report:     [x]  Yes  []  No      If Yes:  Date Range for reporting period:  Initial Eval: 2022  Beginnin2022 --- Endin22  Beginnin2022 ---- Endin22    Progress report will be due (10 Rx or 30 days whichever is less): 37    Recertification will be due (POC Duration  / 90 days whichever is less): 22      Visit # Insurance Allowable Auth Required   In Person 5 30 (does not need auth) []  Yes     [x]  No    Tele Health 0  []  Yes     []  No    Total 5       Functional Scale: Quick Dash/Modified Oswestry: 55% (Total Number Sum: 35)   Date assessed: 2022   Functional Scale: Quick Dash/Modified Oswestry: 35%       Date assessed: 2022    Latex Allergy:  [x]NO      []YES  Preferred Language for Healthcare:   [x]English       []other:    Pain level: 0-1/10     SUBJECTIVE:  (Patient is 9 weeks post op on 4/21/22) Doing well but states he isn't able to lift anything very heavy. Still feels very weak. OBJECTIVE:    Observation: overall hypomobile GH mobs   Test measurements:   AROM SFLX 160  SABD 130    SER @ side 70   SIR behind back T10   MMT  FLX/IR/ER 4 to 4+/5       RESTRICTIONS/PRECAUTIONS: Frequent hx of seizures     **Protocal in media file**  EXERCISES FOR WEEKS 7-9 ? Exercises/Interventions:   Therapeutic Ex (08063)  NMR re-education (64520) Sets/Reps Notes/CUES   Pulley 5 min    Supine cane scaption 10x10\" HEP   Supine cane ER  Supine shoulder flexion  Supine punch  Side lying ER  Side lying abd 10x10\"  1# x20  2# x20  1# x20  1# x20 HEP ? TB rows w scap set GTB 20x3\" Difficulty w scap set - HEP   TB ext w scap set GTB 20x3\" difficutly w scap set - HEP   TB ER/IR (NV) GTB x20 ea HEP   Wall slides BUE scaption/flexion 10x ea HEP        Wall push ups x20    Ball on wall x20 ea ??, ??, CW/CCW green plyo             Prone Row / Extension  X20 /x 20    Prone Hor ABD  x20                                                 Pt ed x5' Review and update HEP, progression and expectations. Provided instruction in shoulder safe zone priniciples. Manual Intervention (02548)          GI-II distraction/oscillation/xcircumduction for relaxation and pain control x8'    PROM IR/ER/FLX 8'                   Access Code [email or web address] HEP                    Patient Education             Therapeutic Exercise and NMR EXR  [x] (68277) Provided verbal/tactile cueing for activities related to strengthening, flexibility, endurance, ROM  for improvements in scapular, scapulothoracic and UE control with self care, reaching, carrying, lifting, house/yardwork, driving/computer work.     [x] (55115) Provided verbal/tactile cueing for activities related to improving balance, coordination, kinesthetic sense, posture, motor skill, proprioception  to assist with scapular, scapulothoracic and UE control with self care, reaching, carrying, lifting, house/yardwork, driving/computer work. Therapeutic Activities:    [] (48222 or 83796) Provided verbal/tactile cueing for activities related to improving balance, coordination, kinesthetic sense, posture, motor skill, proprioception and motor activation to allow for proper function of scapular, scapulothoracic and UE control with self care, carrying, lifting, driving/computer work.      Home Exercise Program:    [x] (14637) Reviewed/Progressed HEP activities related to strengthening, flexibility, endurance, ROM of scapular, scapulothoracic and UE control with self care, reaching, carrying, lifting, house/yardwork, driving/computer work  [x] (03638) Reviewed/Progressed HEP activities related to improving balance, coordination, kinesthetic sense, posture, motor skill, proprioception of scapular, scapulothoracic and UE control with self care, reaching, carrying, lifting, house/yardwork, driving/computer work      Manual Treatments:  PROM / STM / Oscillations-Mobs:  G-I, II, III, IV (PA's, Inf., Post.)  [x] (80822) Provided manual therapy to mobilize soft tissue/joints of cervical/CT, scapular GHJ and UE for the purpose of modulating pain, promoting relaxation,  increasing ROM, reducing/eliminating soft tissue swelling/inflammation/restriction, improving soft tissue extensibility and allowing for proper ROM for normal function with self care, reaching, carrying, lifting, house/yardwork, driving/computer work    Modalities:      Charges:  Timed Code Treatment Minutes: 55   Total Treatment Minutes:  55   BWC:  TE TIME:  NMR TIME:  MANUAL TIME:  UNTIMED MINUTES:  Medicare Total:                 [] EVAL (LOW) 51455 (typically 20 minutes face-to-face)  [] EVAL (MOD) 45383 (typically 30 minutes face-to-face)  [] EVAL (HIGH) 47994 (typically 45 minutes face-to-face)  [] RE-EVAL     [x] DJ(93075) x 2    [] IONTO  [x] NMR (70019) x 1     [] VASO  [x] Manual (48459) x 1    [] Other:  [] TA x      [] Mech Traction (61984)  [] ES(attended) (73648)     [] ES (un) (26834):    ASSESSMENT:    Pt tolerated session well. He had not been in due to being ill. Improved ROM and strength this visit. Patient will benefit from continued skilled intervention to increase ROM, flexibility, strength and function. GOALS:   Short Term Goals: To be achieved in: 2 weeks  1. Independent in HEP and progression per patient tolerance, in order to prevent re-injury. []? Progressing: [x]? Met: []? Not Met: []? Adjusted       2. Patient will have a decrease in pain to facilitate improvement in movement, function, and ADLs as indicated by Functional Deficits. []? Progressing: [x]? Met: []? Not Met: []? Adjusted          Long Term Goals: To be achieved in: 12 weeks  1. Disability index score of 20% or less for the QuickDash/Oswestry to assist with reaching prior level of function. [x]? Progressing: []? Met: []? Not Met: []? Adjusted      2. Patient will demonstrate increased AROM to equal the opposite side bilaterally to allow for proper joint functioning as indicated by patients Functional Deficits. [x]? Progressing: []? Met: []? Not Met: []? Adjusted       3. Patient will demonstrate an increase in strength to match bilaterally or be within 3lbs on the HHD to allow for proper functional mobility as indicated by patients Functional Deficits. [x]? Progressing: []? Met: []? Not Met: []? Adjusted       4. Patient will return to all transfers, work activities, and functional activities without increased symptoms or restriction. [x]? Progressing: []? Met: []? Not Met: []? Adjusted       5. Patient will have 0/10 pain with ADL's.  []? Progressing: [x]? Met: []? Not Met: []? Adjusted       6. Patient stated goal: Return to work full time with no restrictions ()  [x]? Progressing: []? Met: []? Not Met: []?  Adjusted          Overall Progression Towards Functional goals/ Treatment Progress Update:  [x] Patient is progressing as expected towards functional goals listed. [] Progression is slowed due to complexities/Impairments listed. [] Progression has been slowed due to co-morbidities. [] Plan just implemented, too soon to assess goals progression <30days   [] Goals require adjustment due to lack of progress  [] Patient is not progressing as expected and requires additional follow up with physician  [] Other    Prognosis for POC: [x] Good [] Fair  [] Poor    Patient requires continued skilled intervention: [x] Yes  [] No    Treatment/Activity Tolerance:  [x] Patient able to complete treatment  [] Patient limited by fatigue  [] Patient limited by pain    [] Patient limited by other medical complications  [] Other:                  PLAN: Cont x4 weeks for increased strength, ROM and function. [x] Continue per plan of care [] Alter current plan (see comments above)  [] Plan of care initiated [] Hold pending MD visit [] Discharge    Electronically signed by:  Jonatan Benítez PTA   Note: If patient does not return for scheduled/ recommended follow up visits, this note will serve as a discharge from care along with most recent update on progress.

## 2022-05-02 ENCOUNTER — HOSPITAL ENCOUNTER (OUTPATIENT)
Dept: PHYSICAL THERAPY | Age: 22
Setting detail: THERAPIES SERIES
Discharge: HOME | End: 2022-05-02
Payer: COMMERCIAL

## 2022-05-02 PROCEDURE — 97140 MANUAL THERAPY 1/> REGIONS: CPT | Performed by: PHYSICAL THERAPY ASSISTANT

## 2022-05-02 PROCEDURE — 97110 THERAPEUTIC EXERCISES: CPT | Performed by: PHYSICAL THERAPY ASSISTANT

## 2022-05-02 PROCEDURE — 97112 NEUROMUSCULAR REEDUCATION: CPT | Performed by: PHYSICAL THERAPY ASSISTANT

## 2022-05-02 NOTE — FLOWSHEET NOTE
[address] [facility] and [address] [place] [address] [address] [place] [address], [address] [place] [address]  Phone: [phone], [phone]      Physical Therapy Treatment Note/ Progress Report:     Date:  2022    Patient Name:  Claudine Dejesus    :  2000  MRN: [id number]  Restrictions/Precautions:    Medical/Treatment Diagnosis Information:  · Diagnosis: Left Shoulder Labrum Repair / Replissage (22)  · Treatment Diagnosis: P39.60O  Insurance/Certification information:  PT Insurance Information: Ruthann  Physician Information:  Referring Practitioner: Dr. Jude Street  Has the plan of care been signed (Y/N):        [x]  Yes  []  No     Date of Patient follow up with Physician: 22    Is this a Progress Report:     [x]  Yes  []  No      If Yes:  Date Range for reporting period:  Initial Eval: 2022  Beginnin2022 --- Endin22  Beginnin2022 ---- Endin22    Progress report will be due (10 Rx or 30 days whichever is less): 4/8/10    Recertification will be due (POC Duration  / 90 days whichever is less): 22      Visit # Insurance Allowable Auth Required   In Person 6 30 (does not need auth) []  Yes     [x]  No    [facility] 0  []  Yes     []  No    Total 6       Functional Scale: Quick Dash/Modified Oswestry: 55% (Total Number Sum: 35)   Date assessed: 2022   Functional Scale: Quick Dash/Modified Oswestry: 35%       Date assessed: 2022    Latex Allergy:  [x]NO      []YES  Preferred Language for Healthcare:   [x]English       []other:    Pain level:  0-1/10     SUBJECTIVE:  (Patient is 9 weeks post op on 22) Patient reports that he is doing well and his right shoulder is actually hurting more than his left at this point.     OBJECTIVE:    Observation: overall hypomobile GH mobs   Test measurements:   AROM SFLX 160  SABD 130    SER @ side 70   SIR behind back T10   MMT  FLX/IR/ER 4 to 4+/5       RESTRICTIONS/PRECAUTIONS: Frequent hx of seizures     **Protocal in media file**  EXERCISES FOR WEEKS 7-9 ? Exercises/Interventions:   Therapeutic Ex (51705)  NMR re-education (18946) Sets/Reps Notes/CUES   Pulley 5 min    Supine cane scaption 10x10\" HEP   Supine cane ER  Supine shoulder flexion  Supine punch  Side lying ER  Side lying abd 10x10\"  2# x20  2# x30  2# x20  1# x20 HEP ? TB rows w scap set GTB 3\"x30 Difficulty w scap set - HEP   TB ext w scap set GTB 30x3\" difficutly w scap set - HEP   TB ER/IR (NV) GTB x20 ea HEP   Wall slides BUE scaption/flexion 20x ea HEP   PRE flexion/scaption x20 ea     Wall push ups x20    Ball on wall x20 ea ??, ??, CW/CCW green plyo             Prone Row / Extension  x20 /x 20    Prone Hor ABD  x20                                                 Pt ed x5' Review and update HEP, progression and expectations. Provided instruction in shoulder safe zone priniciples. Manual Intervention (64477)          GI-II distraction/oscillation/xcircumduction for relaxation and pain control x8'    PROM IR/ER/FLX 8'                   Access Code [email or web address] HEP                    Patient Education             Therapeutic Exercise and NMR EXR  [x] (07109) Provided verbal/tactile cueing for activities related to strengthening, flexibility, endurance, ROM  for improvements in scapular, scapulothoracic and UE control with self care, reaching, carrying, lifting, house/yardwork, driving/computer work. [x] (14817) Provided verbal/tactile cueing for activities related to improving balance, coordination, kinesthetic sense, posture, motor skill, proprioception  to assist with  scapular, scapulothoracic and UE control with self care, reaching, carrying, lifting, house/yardwork, driving/computer work.     Therapeutic Activities:    [] (09469 or 44391) Provided verbal/tactile cueing for activities related to improving balance, coordination, kinesthetic sense, posture, motor skill, proprioception and motor activation to allow for proper function of scapular, scapulothoracic and UE control with self care, carrying, lifting, driving/computer work. Home Exercise Program:    [x] (40907) Reviewed/Progressed HEP activities related to strengthening, flexibility, endurance, ROM of scapular, scapulothoracic and UE control with self care, reaching, carrying, lifting, house/yardwork, driving/computer work  [x] (35302) Reviewed/Progressed HEP activities related to improving balance, coordination, kinesthetic sense, posture, motor skill, proprioception of scapular, scapulothoracic and UE control with self care, reaching, carrying, lifting, house/yardwork, driving/computer work      Manual Treatments:  PROM / STM / Oscillations-Mobs:  G-I, II, III, IV (PA's, Inf., Post.)  [x] (81244) Provided manual therapy to mobilize soft tissue/joints of cervical/CT, scapular GHJ and UE for the purpose of modulating pain, promoting relaxation,  increasing ROM, reducing/eliminating soft tissue swelling/inflammation/restriction, improving soft tissue extensibility and allowing for proper ROM for normal function with self care, reaching, carrying, lifting, house/yardwork, driving/computer work    Modalities:      Charges:  Timed Code Treatment Minutes: 55   Total Treatment Minutes:  55   BWC:  TE TIME:  NMR TIME:  MANUAL TIME:  UNTIMED MINUTES:  Medicare Total:                 [] EVAL (LOW) 35303 (typically 20 minutes face-to-face)  [] EVAL (MOD) 36341 (typically 30 minutes face-to-face)  [] EVAL (HIGH) 88230 (typically 45 minutes face-to-face)  [] RE-EVAL     [x] KW(25730) x 2    [] IONTO  [x] NMR (15495) x 1     [] VASO  [x] Manual (39942) x 1    [] Other:  [] TA x      [] Mech Traction (45149)  [] ES(attended) (44222)     [] ES (un) (37163):    ASSESSMENT:    Pt tolerated session well. He had not been in due to being ill. Improved ROM and strength this visit.  Patient will benefit from continued skilled intervention to increase ROM, flexibility, strength and function. GOALS:   Short Term Goals: To be achieved in: 2 weeks  1. Independent in HEP and progression per patient tolerance, in order to prevent re-injury. []? Progressing: [x]? Met: []? Not Met: []? Adjusted       2. Patient will have a decrease in pain to facilitate improvement in movement, function, and ADLs as indicated by Functional Deficits. []? Progressing: [x]? Met: []? Not Met: []? Adjusted          Long Term Goals: To be achieved in: 12 weeks  1. Disability index score of 20% or less for the QuickDash/Oswestry to assist with reaching prior level of function. [x]? Progressing: []? Met: []? Not Met: []? Adjusted      2. Patient will demonstrate increased AROM to equal the opposite side bilaterally to allow for proper joint functioning as indicated by patients Functional Deficits. [x]? Progressing: []? Met: []? Not Met: []? Adjusted       3. Patient will demonstrate an increase in strength to match bilaterally or be within 3lbs on the HHD to allow for proper functional mobility as indicated by patients Functional Deficits. [x]? Progressing: []? Met: []? Not Met: []? Adjusted       4. Patient will return to all transfers, work activities, and functional activities without increased symptoms or restriction. [x]? Progressing: []? Met: []? Not Met: []? Adjusted       5. Patient will have 0/10 pain with ADL's.  []? Progressing: [x]? Met: []? Not Met: []? Adjusted       6. Patient stated goal: Return to work full time with no restrictions ()  [x]? Progressing: []? Met: []? Not Met: []? Adjusted          Overall Progression Towards Functional goals/ Treatment Progress Update:  [x] Patient is progressing as expected towards functional goals listed. [] Progression is slowed due to complexities/Impairments listed. [] Progression has been slowed due to co-morbidities.   [] Plan just implemented, too soon to assess goals progression <30days   [] Goals require adjustment due to lack of progress  [] Patient is not progressing as expected and requires additional follow up with physician  [] Other    Prognosis for POC: [x] Good [] Fair  [] Poor    Patient requires continued skilled intervention: [x] Yes  [] No    Treatment/Activity Tolerance:  [x] Patient able to complete treatment  [] Patient limited by fatigue  [] Patient limited by pain    [] Patient limited by other medical complications  [] Other:                  PLAN: Cont x4 weeks for increased strength, ROM and function. [x] Continue per plan of care [] Alter current plan (see comments above)  [] Plan of care initiated [] Hold pending MD visit [] Discharge    Electronically signed by:  Justyn Marx PTA  Note: If patient does not return for scheduled/ recommended follow up visits, this note will serve as a discharge from care along with most recent update on progress.

## 2022-05-11 ENCOUNTER — HOSPITAL ENCOUNTER (OUTPATIENT)
Dept: PHYSICAL THERAPY | Age: 22
Setting detail: THERAPIES SERIES
Discharge: HOME | End: 2022-05-11
Payer: COMMERCIAL

## 2022-05-11 PROCEDURE — 97140 MANUAL THERAPY 1/> REGIONS: CPT

## 2022-05-11 PROCEDURE — 97112 NEUROMUSCULAR REEDUCATION: CPT

## 2022-05-11 PROCEDURE — 97110 THERAPEUTIC EXERCISES: CPT

## 2022-05-11 NOTE — FLOWSHEET NOTE
[address] [facility] and [address] [place] [address] [address] [place] [address], [address] [place] [address]  Phone: [phone], [phone]      Physical Therapy Treatment Note/ Progress Report:     Date:  2022    Patient Name:  Delia Arellano    :  2000  MRN: [id number]  Restrictions/Precautions:    Medical/Treatment Diagnosis Information:  · Diagnosis: Left Shoulder Labrum Repair / Replissage (22)  · Treatment Diagnosis: J41.60L  Insurance/Certification information:  PT Insurance Information: Ruthann  Physician Information:  Referring Practitioner: Dr. Arnulfo Stovall  Has the plan of care been signed (Y/N):        [x]  Yes  []  No     Date of Patient follow up with Physician: 22    Is this a Progress Report:     [x]  Yes  []  No      If Yes:  Date Range for reporting period:  Initial Eval: 2022  Beginnin2022 --- Endin22  Beginnin2022 ---- Endin22  Beginnin2022 ---- Endin22        Progress report will be due (10 Rx or 30 days whichever is less):     Recertification will be due (POC Duration  / 90 days whichever is less): 22      Visit # Insurance Allowable Auth Required   In Person 7 30 (does not need auth) []  Yes     [x]  No    [facility] 0  []  Yes     []  No    Total 7       Functional Scale: Quick Dash/Modified Oswestry: 55% (Total Number Sum: 35)   Date assessed: 2022   Functional Scale: Quick Dash/Modified Oswestry: 35%       Date assessed: 2022    Latex Allergy:  [x]NO      []YES  Preferred Language for Healthcare:   [x]English       []other:    Pain level:  0-1/10     SUBJECTIVE:  (Patient is 12 weeks post op on 2022) Pt stated having been back to work since last .  States MD put him on a lifting restriction until he sees him again    OBJECTIVE:    Observation: overall hypomobile GH mobs   Test measurements:   AROM SFLX 160  SABD 130    SER @ side 70   SIR behind back T10   MMT  FLX/IR/ER 4 to 4+/5       RESTRICTIONS/PRECAUTIONS: Frequent hx of seizures     **Protocal in media file**  EXERCISES FOR WEEKS 7-9 ? Exercises/Interventions:   Therapeutic Ex (42654)  NMR re-education (80783) Sets/Reps Notes/CUES   Pulley 5 min    Supine cane scaption 10x10\" HEP   Supine cane ER  Supine shoulder flexion  Supine punch  Side lying ER  Side lying abd 10x10\"  2# x20  2# x30  2# x20  1# x20 HEP ? TB rows w scap set GTB 3\"x30 Difficulty w scap set - HEP   TB ext w scap set GTB 30x3\" difficutly w scap set - HEP   TB ER/IR (NV) GTB x20 ea HEP   Wall slides BUE scaption/flexion 20x ea HEP   PRE flexion/scaption x20 ea     Wall push ups x20    Ball on wall x20 ea ??, ??, CW/CCW green plyo             Prone Row / Extension  x20 /x 20    Prone Hor ABD  x20                                                 Pt ed x5' Review and update HEP, progression and expectations. Provided instruction in shoulder safe zone priniciples. Manual Intervention (93352)          GI-II distraction/oscillation/xcircumduction for relaxation and pain control x8'    PROM IR/ER/FLX 8'                   Access Code [email or web address] HEP                    Patient Education             Therapeutic Exercise and NMR EXR  [x] (62818) Provided verbal/tactile cueing for activities related to strengthening, flexibility, endurance, ROM  for improvements in scapular, scapulothoracic and UE control with self care, reaching, carrying, lifting, house/yardwork, driving/computer work. [x] (10036) Provided verbal/tactile cueing for activities related to improving balance, coordination, kinesthetic sense, posture, motor skill, proprioception  to assist with  scapular, scapulothoracic and UE control with self care, reaching, carrying, lifting, house/yardwork, driving/computer work.     Therapeutic Activities:    [] (56736 or 19527) Provided verbal/tactile cueing for activities related to improving balance, coordination, kinesthetic sense, posture, motor skill, proprioception and motor activation to allow for proper function of scapular, scapulothoracic and UE control with self care, carrying, lifting, driving/computer work. Home Exercise Program:    [x] (88958) Reviewed/Progressed HEP activities related to strengthening, flexibility, endurance, ROM of scapular, scapulothoracic and UE control with self care, reaching, carrying, lifting, house/yardwork, driving/computer work  [x] (49106) Reviewed/Progressed HEP activities related to improving balance, coordination, kinesthetic sense, posture, motor skill, proprioception of scapular, scapulothoracic and UE control with self care, reaching, carrying, lifting, house/yardwork, driving/computer work      Manual Treatments:  PROM / STM / Oscillations-Mobs:  G-I, II, III, IV (PA's, Inf., Post.)  [x] (04089) Provided manual therapy to mobilize soft tissue/joints of cervical/CT, scapular GHJ and UE for the purpose of modulating pain, promoting relaxation,  increasing ROM, reducing/eliminating soft tissue swelling/inflammation/restriction, improving soft tissue extensibility and allowing for proper ROM for normal function with self care, reaching, carrying, lifting, house/yardwork, driving/computer work    Modalities:      Charges:  Timed Code Treatment Minutes: 55   Total Treatment Minutes:  55   BWC:  TE TIME:  NMR TIME:  MANUAL TIME:  UNTIMED MINUTES:  Medicare Total:                 [] EVAL (LOW) 67413 (typically 20 minutes face-to-face)  [] EVAL (MOD) 95180 (typically 30 minutes face-to-face)  [] EVAL (HIGH) 66805 (typically 45 minutes face-to-face)  [] RE-EVAL     [x] FG(51427) x 2    [] IONTO  [x] NMR (49344) x 1     [] VASO  [x] Manual (77734) x 1    [] Other:  [] TA x      [] Mech Traction (13662)  [] ES(attended) (00739)     [] ES (un) (06835):    ASSESSMENT:    Pt tolerated session well. He had not been in due to being ill. Improved ROM and strength this visit.  Patient will benefit from continued skilled intervention to increase ROM, flexibility, strength and function. GOALS:   Short Term Goals: To be achieved in: 2 weeks  1. Independent in HEP and progression per patient tolerance, in order to prevent re-injury. []? Progressing: [x]? Met: []? Not Met: []? Adjusted       2. Patient will have a decrease in pain to facilitate improvement in movement, function, and ADLs as indicated by Functional Deficits. []? Progressing: [x]? Met: []? Not Met: []? Adjusted          Long Term Goals: To be achieved in: 12 weeks  1. Disability index score of 20% or less for the QuickDash/Oswestry to assist with reaching prior level of function. [x]? Progressing: []? Met: []? Not Met: []? Adjusted      2. Patient will demonstrate increased AROM to equal the opposite side bilaterally to allow for proper joint functioning as indicated by patients Functional Deficits. [x]? Progressing: []? Met: []? Not Met: []? Adjusted       3. Patient will demonstrate an increase in strength to match bilaterally or be within 3lbs on the HHD to allow for proper functional mobility as indicated by patients Functional Deficits. [x]? Progressing: []? Met: []? Not Met: []? Adjusted       4. Patient will return to all transfers, work activities, and functional activities without increased symptoms or restriction. [x]? Progressing: []? Met: []? Not Met: []? Adjusted       5. Patient will have 0/10 pain with ADL's.  []? Progressing: [x]? Met: []? Not Met: []? Adjusted       6. Patient stated goal: Return to work full time with no restrictions ()  [x]? Progressing: []? Met: []? Not Met: []? Adjusted          Overall Progression Towards Functional goals/ Treatment Progress Update:  [x] Patient is progressing as expected towards functional goals listed. [] Progression is slowed due to complexities/Impairments listed. [] Progression has been slowed due to co-morbidities.   [] Plan just implemented, too soon to assess goals progression <30days   [] Goals require adjustment due to lack of progress  [] Patient is not progressing as expected and requires additional follow up with physician  [] Other    Prognosis for POC: [x] Good [] Fair  [] Poor    Patient requires continued skilled intervention: [x] Yes  [] No    Treatment/Activity Tolerance:  [x] Patient able to complete treatment  [] Patient limited by fatigue  [] Patient limited by pain    [] Patient limited by other medical complications  [] Other:                  PLAN: Cont x4 weeks for increased strength, ROM and function. [x] Continue per plan of care [] Alter current plan (see comments above)  [] Plan of care initiated [] Hold pending MD visit [] Discharge    Electronically signed by:  Sandra Gonzalez, PT MPT,ATC, cert DN   Note: If patient does not return for scheduled/ recommended follow up visits, this note will serve as a discharge from care along with most recent update on progress.

## 2022-05-18 ENCOUNTER — HOSPITAL ENCOUNTER (OUTPATIENT)
Dept: PHYSICAL THERAPY | Age: 22
Setting detail: THERAPIES SERIES
Discharge: HOME | End: 2022-05-18
Payer: COMMERCIAL

## 2022-05-18 NOTE — FLOWSHEET NOTE
[address], [facility] ([facility])    Physical Therapy  Cancellation/No-show Note  Patient Name:  Thalia Hanson  :  2000   Date:  2022    Cancelled visits to date: 3  No-shows to date: 0    For today's appointment patient:  [x]  Cancelled  []  Rescheduled appointment  []  No-show     Reason given by patient:  []  Patient ill  []  Conflicting appointment  []  No transportation    [x]  Conflict with work  []  No reason given  []  Other:     Comments:      Phone call information:   []  Phone call made today to patient. []  Patient answered, conversation as follows:    []  Patient did not answer. [x]  Phone call not needed - pt contacted us to cancel and provided reason for cancellation.      Electronically signed by:  Oliva Alexis PTA

## 2022-05-19 ENCOUNTER — HOSPITAL ENCOUNTER (OUTPATIENT)
Dept: PHYSICAL THERAPY | Age: 22
Setting detail: THERAPIES SERIES
End: 2022-05-19
Payer: COMMERCIAL

## 2022-05-23 ENCOUNTER — HOSPITAL ENCOUNTER (OUTPATIENT)
Dept: PHYSICAL THERAPY | Age: 22
Setting detail: THERAPIES SERIES
Discharge: HOME | End: 2022-05-23
Payer: COMMERCIAL

## 2022-05-23 PROCEDURE — 97112 NEUROMUSCULAR REEDUCATION: CPT | Performed by: PHYSICAL THERAPY ASSISTANT

## 2022-05-23 PROCEDURE — 97140 MANUAL THERAPY 1/> REGIONS: CPT | Performed by: PHYSICAL THERAPY ASSISTANT

## 2022-05-23 PROCEDURE — 97110 THERAPEUTIC EXERCISES: CPT | Performed by: PHYSICAL THERAPY ASSISTANT

## 2022-05-23 NOTE — FLOWSHEET NOTE
[place] [address], [address], [place], [address]  Phone: [phone], [phone]  Date: 2022          Patient Name; :  Enrrique Martinez; 2000   · Dx/ICD Code: Diagnosis: Left Shoulder Labrum Repair / Replissage (22)  · Treatment Diagnosis: S43.00D       Physician:  Dr Carlos Irizarry        Total PT Visits: 8     Measures Previous Current   Pain (0-10) 3/10 0-1/10   Disability % Quick Dash/Modified Oswestry: 55% (Total Number Sum: 35)   Quick Dash:  14% (Total 17/35)    FOTO Score     ROM Flexion 90 degrees Active: Flexion 164          IR T5    ER 15 degrees  ER T3             Strength N/T Flexion 4+/5     ABD 5/5     IR 4+/5     ER 4+/5     Specific Functional Improvements & Impressions:  Ashley Renteria is progressing well in therapy with his ROM and functional activities. He is back to work with restrictions without issue. He notes his primary complaint at this point is of weakness and fatigue in the arm. Plan & Recommendations:  [x] Continue rehabilitation due to objective improvement and continued functional deficits with frequency and duration: 1-2 times a week for 4 weeks for strengthening  [] Progress toward  []GAP, []Work Conditioning, []Independent HEP   [] Discharge due to   [] All goals achieved, [] Maximized \"medical necessity\" [] No subjective or objective improvements      Electronically signed by:  Matt Sánchez PTA / Sven Winston, PT, DPT, OMT-C  [id number]      Therapy Plan of Care Re-Certification  This patient has been re-evaluated for physical therapy services and for therapy to continue, Medicare, Medicaid and other insurances require periodic physician review of the treatment plan.  Please review the above re-evaluation and verify that you agree with plan of care as established above by signing the attached document and return it to our office or note changes to established plan below  [] Follow treatment plan as above [] Discontinue physical therapy  [] Change plan to:                                 __________________________________________________    Physician Signature:____________________________________ Date:____________  By signing above, therapists plan is approved by physician    If you have any questions or concerns, please don't hesitate to call.   Thank you for your referral.    [facility] [address] office  ([phone])     Fax [phone]       Physical Therapy Treatment Note/ Progress Report:     Date:  2022    Patient Name:  Thor Man    :  2000  MRN: [id number]  Restrictions/Precautions:    Medical/Treatment Diagnosis Information:  · Diagnosis: Left Shoulder Labrum Repair / Replissage (22)  · Treatment Diagnosis: J13.33Q  Insurance/Certification information:  PT Insurance Information: Ruthann  Physician Information:  Referring Practitioner: Dr. Rocky Galeana  Has the plan of care been signed (Y/N):        [x]  Yes  []  No     Date of Patient follow up with Physician: 22    Is this a Progress Report:     [x]  Yes  []  No      If Yes:  Date Range for reporting period:  Initial Eval: 2022  Beginnin2022 --- Endin22  Beginnin2022 ---- Endin22  Beginnin2022 ---- Endin22  Beginnin2022 --- Endin2022        Progress report will be due (10 Rx or 30 days whichever is less): 66    Recertification will be due (POC Duration  / 90 days whichever is less): 22      Visit # Insurance Allowable Auth Required   In Person 8 30 (does not need auth) []  Yes     [x]  No    Tele Health 0  []  Yes     []  No    Total 8       Functional Scale: Quick Dash/Modified Oswestry: 55% (Total Number Sum: 35)   Date assessed: 2022   Functional Scale: Quick Dash/Modified Oswestry: 35%       Date assessed: 2022  Functional Scale:  Quick Dash:  14% (Total 17/35)       Date:  2022    Latex Allergy:  [x]NO      []YES  Preferred Language for Healthcare:   [x]English       []other:    Pain level:  0-1/10     SUBJECTIVE:  (Patient is 12 weeks post op on 5/12/2022) Doing pretty well. Work is going well.  OBJECTIVE:    5/23/2022 Flexion 164, , IR T5, ER T3   o Flexion    Observation: overall hypomobile GH mobs   Test measurements:   AROM SFLX 160  SABD 130    SER @ side 70   SIR behind back T10   MMT  FLX/IR/ER 4 to 4+/5       RESTRICTIONS/PRECAUTIONS: Frequent hx of seizures     **Protocal in media file**  EXERCISES FOR WEEKS 7-9 ? Exercises/Interventions:   Therapeutic Ex (30711)  NMR re-education (35588) Sets/Reps Notes/CUES   Pulley 5 min    Supine cane scaption 10x10\" HEP   Supine cane ER  Supine shoulder flexion  Supine punch  Side lying ER  Side lying abd 10x10\"  2# x20  2# x30  2# x20  1# x20 HEP ? TB rows w scap set GTB 3\"x30 Difficulty w scap set - HEP   TB ext w scap set GTB 30x3\" difficutly w scap set - HEP   TB ER/IR (NV) GTB x20 ea HEP   Wall slides BUE scaption/flexion 20x ea HEP   PRE flexion/scaption x20 ea     Wall push ups x20    Ball on wall x20 ea ??, ??, CW/CCW green plyo             Prone Row / Extension  x20 /x 20    Prone Hor ABD  x20                                                 Pt ed x5' Review and update HEP, progression and expectations. Provided instruction in shoulder safe zone priniciples. Manual Intervention (59674)          GI-II distraction/oscillation/xcircumduction for relaxation and pain control x8'    PROM IR/ER/FLX 8'                   Access Code [email or web address] HEP                    Patient Education             Therapeutic Exercise and NMR EXR  [x] (92557) Provided verbal/tactile cueing for activities related to strengthening, flexibility, endurance, ROM  for improvements in scapular, scapulothoracic and UE control with self care, reaching, carrying, lifting, house/yardwork, driving/computer work.     [x] (09988) Provided verbal/tactile cueing for activities related to improving balance, coordination, kinesthetic sense, posture, motor skill, proprioception  to assist with  scapular, scapulothoracic and UE control with self care, reaching, carrying, lifting, house/yardwork, driving/computer work. Therapeutic Activities:    [] (92382 or 94625) Provided verbal/tactile cueing for activities related to improving balance, coordination, kinesthetic sense, posture, motor skill, proprioception and motor activation to allow for proper function of scapular, scapulothoracic and UE control with self care, carrying, lifting, driving/computer work.      Home Exercise Program:    [x] (94736) Reviewed/Progressed HEP activities related to strengthening, flexibility, endurance, ROM of scapular, scapulothoracic and UE control with self care, reaching, carrying, lifting, house/yardwork, driving/computer work  [x] (77615) Reviewed/Progressed HEP activities related to improving balance, coordination, kinesthetic sense, posture, motor skill, proprioception of scapular, scapulothoracic and UE control with self care, reaching, carrying, lifting, house/yardwork, driving/computer work      Manual Treatments:  PROM / STM / Oscillations-Mobs:  G-I, II, III, IV (PA's, Inf., Post.)  [x] (37549) Provided manual therapy to mobilize soft tissue/joints of cervical/CT, scapular GHJ and UE for the purpose of modulating pain, promoting relaxation,  increasing ROM, reducing/eliminating soft tissue swelling/inflammation/restriction, improving soft tissue extensibility and allowing for proper ROM for normal function with self care, reaching, carrying, lifting, house/yardwork, driving/computer work    Modalities:      Charges:  Timed Code Treatment Minutes: 55   Total Treatment Minutes:  55   BWC:  TE TIME:  NMR TIME:  MANUAL TIME:  UNTIMED MINUTES:  Medicare Total:                 [] EVAL (LOW) 32144 (typically 20 minutes face-to-face)  [] EVAL (MOD) 07294 (typically 30 minutes face-to-face)  [] EVAL (HIGH) 07359 (typically 45 minutes face-to-face)  [] RE-EVAL     [x] ZW(36956) x 2    [] IONTO  [x] NMR (00991) x 1     [] VASO  [x] Manual (67405) x 1    [] Other:  [] TA x      [] Mech Traction (76160)  [] ES(attended) (39202)     [] ES (un) (61495):    ASSESSMENT:    Pt tolerated session well. He had not been in due to being ill. Improved ROM and strength this visit. Patient will benefit from continued skilled intervention to increase ROM, flexibility, strength and function. GOALS:   Short Term Goals: To be achieved in: 2 weeks  1. Independent in HEP and progression per patient tolerance, in order to prevent re-injury. []? Progressing: [x]? Met: []? Not Met: []? Adjusted       2. Patient will have a decrease in pain to facilitate improvement in movement, function, and ADLs as indicated by Functional Deficits. []? Progressing: [x]? Met: []? Not Met: []? Adjusted          Long Term Goals: To be achieved in: 12 weeks  1. Disability index score of 20% or less for the QuickDash/Oswestry to assist with reaching prior level of function. []? Progressing: [x]? Met: []? Not Met: []? Adjusted      2. Patient will demonstrate increased AROM to equal the opposite side bilaterally to allow for proper joint functioning as indicated by patients Functional Deficits. [x]? Progressing: []? Met: []? Not Met: []? Adjusted       3. Patient will demonstrate an increase in strength to match bilaterally or be within 3lbs on the HHD to allow for proper functional mobility as indicated by patients Functional Deficits. [x]? Progressing: []? Met: []? Not Met: []? Adjusted       4. Patient will return to all transfers, work activities, and functional activities without increased symptoms or restriction. [x]? Progressing: []? Met: []? Not Met: []? Adjusted       5. Patient will have 0/10 pain with ADL's.  []? Progressing: [x]? Met: []? Not Met: []? Adjusted       6.  Patient stated goal: Return to work full time with no restrictions ()  [x]? Progressing: []? Met: []? Not Met: []? Adjusted          Overall Progression Towards Functional goals/ Treatment Progress Update:  [x] Patient is progressing as expected towards functional goals listed. [] Progression is slowed due to complexities/Impairments listed. [] Progression has been slowed due to co-morbidities. [] Plan just implemented, too soon to assess goals progression <30days   [] Goals require adjustment due to lack of progress  [] Patient is not progressing as expected and requires additional follow up with physician  [] Other    Prognosis for POC: [x] Good [] Fair  [] Poor    Patient requires continued skilled intervention: [x] Yes  [] No    Treatment/Activity Tolerance:  [x] Patient able to complete treatment  [] Patient limited by fatigue  [] Patient limited by pain    [] Patient limited by other medical complications  [] Other:                  PLAN: Cont x4 weeks for increased strength, ROM and function. [x] Continue per plan of care [] Alter current plan (see comments above)  [] Plan of care initiated [] Hold pending MD visit [] Discharge    Electronically signed by:  Zia Dennison PTA  Note: If patient does not return for scheduled/ recommended follow up visits, this note will serve as a discharge from care along with most recent update on progress.

## 2022-05-31 ENCOUNTER — HOSPITAL ENCOUNTER (OUTPATIENT)
Dept: PHYSICAL THERAPY | Age: 22
Setting detail: THERAPIES SERIES
Discharge: HOME | End: 2022-05-31
Payer: COMMERCIAL

## 2022-05-31 ENCOUNTER — HOSPITAL ENCOUNTER (OUTPATIENT)
Age: 22
Discharge: HOME | End: 2022-05-31
Payer: COMMERCIAL

## 2022-05-31 LAB
A/G RATIO: 2.1 (ref 1.1–2.2)
ALBUMIN SERPL-MCNC: 4.6 G/DL (ref 3.4–5)
ALP BLD-CCNC: 66 U/L (ref 40–129)
ALT SERPL-CCNC: 24 U/L (ref 10–40)
ANION GAP SERPL CALCULATED.3IONS-SCNC: 10 MMOL/L (ref 3–16)
AST SERPL-CCNC: 22 U/L (ref 15–37)
BILIRUB SERPL-MCNC: 0.7 MG/DL (ref 0–1)
BUN BLDV-MCNC: 13 MG/DL (ref 7–20)
CALCIUM SERPL-MCNC: 9.7 MG/DL (ref 8.3–10.6)
CHLORIDE BLD-SCNC: 102 MMOL/L (ref 99–110)
CO2: 28 MMOL/L (ref 21–32)
CREAT SERPL-MCNC: 1 MG/DL (ref 0.9–1.3)
GFR AFRICAN AMERICAN: >60
GFR NON-AFRICAN AMERICAN: >60
GLUCOSE BLD-MCNC: 85 MG/DL (ref 70–99)
POTASSIUM SERPL-SCNC: 4.1 MMOL/L (ref 3.5–5.1)
SODIUM BLD-SCNC: 140 MMOL/L (ref 136–145)
TOTAL PROTEIN: 6.8 G/DL (ref 6.4–8.2)

## 2022-05-31 PROCEDURE — 80053 COMPREHEN METABOLIC PANEL: CPT

## 2022-05-31 PROCEDURE — 80061 LIPID PANEL: CPT

## 2022-05-31 NOTE — FLOWSHEET NOTE
[place] [address], [facility] ([facility])    Physical Therapy  Cancellation/No-show Note  Patient Name:  Velasquez Corcoran  :  2000   Date:  2022    Cancelled visits to date: 4  No-shows to date: 0    For today's appointment patient:  [x]  Cancelled  []  Rescheduled appointment  []  No-show     Reason given by patient:  []  Patient ill  [x]  Conflicting appointment  []  No transportation    []  Conflict with work  []  No reason given  []  Other:     Comments:      Phone call information:   []  Phone call made today to patient. []  Patient answered, conversation as follows:    []  Patient did not answer. [x]  Phone call not needed - pt contacted us to cancel and provided reason for cancellation.      Electronically signed by:  Osmany Lezama PTA

## 2022-06-01 LAB
CHOLESTEROL, TOTAL: 114 MG/DL (ref 0–199)
HDLC SERPL-MCNC: 68 MG/DL (ref 40–60)
LDL CHOLESTEROL CALCULATED: 35 MG/DL
TRIGL SERPL-MCNC: 53 MG/DL (ref 0–150)
VLDLC SERPL CALC-MCNC: 11 MG/DL

## 2022-06-07 ENCOUNTER — HOSPITAL ENCOUNTER (OUTPATIENT)
Dept: PHYSICAL THERAPY | Age: 22
Setting detail: THERAPIES SERIES
Discharge: HOME | End: 2022-06-07
Payer: COMMERCIAL

## 2022-06-07 PROCEDURE — 97110 THERAPEUTIC EXERCISES: CPT | Performed by: PHYSICAL THERAPY ASSISTANT

## 2022-06-07 PROCEDURE — 97140 MANUAL THERAPY 1/> REGIONS: CPT | Performed by: PHYSICAL THERAPY ASSISTANT

## 2022-06-07 PROCEDURE — 97112 NEUROMUSCULAR REEDUCATION: CPT | Performed by: PHYSICAL THERAPY ASSISTANT

## 2022-06-07 NOTE — FLOWSHEET NOTE
[address] [facility] and [address] [place] [address] [address] [place] [address], [address] [place] [address]  Phone: [phone], [phone]      Physical Therapy Treatment Note/ Progress Report:     Date:  2022    Patient Name:  Giuliano Garcia    :  2000  MRN: [id number]  Restrictions/Precautions:    Medical/Treatment Diagnosis Information:  · Diagnosis: Left Shoulder Labrum Repair / Replissage (22)  · Treatment Diagnosis: L53.13H  Insurance/Certification information:  PT Insurance Information: Ruthann  Physician Information:  Referring Practitioner: Dr. Tyson Finch  Has the plan of care been signed (Y/N):        [x]  Yes  []  No     Date of Patient follow up with Physician: 22    Is this a Progress Report:     [x]  Yes  []  No      If Yes:  Date Range for reporting period:  Initial Eval: 2022  Beginnin2022 --- Endin22  Beginnin2022 ---- Endin22  Beginnin2022 ---- Endin22  Beginnin2022 --- Endin2022        Progress report will be due (10 Rx or 30 days whichever is less): 42    Recertification will be due (POC Duration  / 90 days whichever is less): 22      Visit # Insurance Allowable Auth Required   In Person 9 30 (does not need auth) []  Yes     [x]  No    Tele Health 0  []  Yes     []  No    Total 9       Functional Scale: Quick Dash/Modified Oswestry: 55% (Total Number Sum: 35)   Date assessed: 2022   Functional Scale: Quick Dash/Modified Oswestry: 35%       Date assessed: 2022  Functional Scale:  Quick Dash:  14% (Total 17/35)       Date:  2022    Latex Allergy:  [x]NO      []YES  Preferred Language for Healthcare:   [x]English       []other:    Pain level:  0-1/10     SUBJECTIVE:  (Patient is 12 weeks post op on 2022) Patient reports that he is doing pretty well - no new complaints reported.      OBJECTIVE:    2022 Flexion 164, , IR T5, ER T3   o Flexion  Observation: overall hypomobile GH mobs   Test measurements:   AROM SFLX 160  SABD 130    SER @ side 70   SIR behind back T10   MMT  FLX/IR/ER 4 to 4+/5       RESTRICTIONS/PRECAUTIONS: Frequent hx of seizures     **Protocal in media file**  EXERCISES FOR WEEKS 7-9 ? Exercises/Interventions:   Therapeutic Ex (12579)  NMR re-education (06549) Sets/Reps Notes/CUES   Pulley 5 min         Ball roll up wall 2# x20    Functional Lift 2# x20    Supine cane scaption 10x10\" HEP   Supine cane ER  Supine shoulder flexion  Supine punch  Side lying ER  Side lying abd 10x10\"  2# x20  2# x30  2# x20  1# x20 HEP ? TB rows w scap set BTB 3\"x30 Difficulty w scap set - HEP   TB ext w scap set BTB 3\" x30 difficutly w scap set - HEP   TB ER/IR  GTB x20 ea HEP        PRE flexion/scaption x20 ea     Wall push ups x30    Ball on wall x20 ea ??, ??, CW/CCW green plyo             Prone Row / Extension  2# x20 / 2# x20    Prone Hor ABD  x20                                                 Pt ed x5' Review and update HEP, progression and expectations. Provided instruction in shoulder safe zone priniciples. Manual Intervention (96937)          GI-II distraction/oscillation/xcircumduction for relaxation and pain control x8'    PROM IR/ER/FLX 8'                   Access Code [email or web address] HEP                    Patient Education             Therapeutic Exercise and NMR EXR  [x] (10182) Provided verbal/tactile cueing for activities related to strengthening, flexibility, endurance, ROM  for improvements in scapular, scapulothoracic and UE control with self care, reaching, carrying, lifting, house/yardwork, driving/computer work.     [x] (49419) Provided verbal/tactile cueing for activities related to improving balance, coordination, kinesthetic sense, posture, motor skill, proprioception  to assist with  scapular, scapulothoracic and UE control with self care, reaching, carrying, lifting, house/yardwork, driving/computer work. Therapeutic Activities:    [] (76589 or 67661) Provided verbal/tactile cueing for activities related to improving balance, coordination, kinesthetic sense, posture, motor skill, proprioception and motor activation to allow for proper function of scapular, scapulothoracic and UE control with self care, carrying, lifting, driving/computer work.      Home Exercise Program:    [x] (42192) Reviewed/Progressed HEP activities related to strengthening, flexibility, endurance, ROM of scapular, scapulothoracic and UE control with self care, reaching, carrying, lifting, house/yardwork, driving/computer work  [x] (75085) Reviewed/Progressed HEP activities related to improving balance, coordination, kinesthetic sense, posture, motor skill, proprioception of scapular, scapulothoracic and UE control with self care, reaching, carrying, lifting, house/yardwork, driving/computer work      Manual Treatments:  PROM / STM / Oscillations-Mobs:  G-I, II, III, IV (PA's, Inf., Post.)  [x] (37483) Provided manual therapy to mobilize soft tissue/joints of cervical/CT, scapular GHJ and UE for the purpose of modulating pain, promoting relaxation,  increasing ROM, reducing/eliminating soft tissue swelling/inflammation/restriction, improving soft tissue extensibility and allowing for proper ROM for normal function with self care, reaching, carrying, lifting, house/yardwork, driving/computer work    Modalities:      Charges:  Timed Code Treatment Minutes: 55   Total Treatment Minutes:  55   BWC:  TE TIME:  NMR TIME:  MANUAL TIME:  UNTIMED MINUTES:  Medicare Total:                 [] EVAL (LOW) 35240 (typically 20 minutes face-to-face)  [] EVAL (MOD) 21411 (typically 30 minutes face-to-face)  [] EVAL (HIGH) 36795 (typically 45 minutes face-to-face)  [] RE-EVAL     [x] MG(44754) x 2    [] IONTO  [x] NMR (40238) x 1     [] VASO  [x] Manual (10546) x 1    [] Other:  [] TA x      [] Mech Traction (58348)  [] ES(attended) (41210)     [] ES (un) (07820):    ASSESSMENT:    Pt tolerated session well. He had not been in due to being ill. Improved ROM and strength this visit. Patient will benefit from continued skilled intervention to increase ROM, flexibility, strength and function. GOALS:   Short Term Goals: To be achieved in: 2 weeks  1. Independent in HEP and progression per patient tolerance, in order to prevent re-injury. []? Progressing: [x]? Met: []? Not Met: []? Adjusted       2. Patient will have a decrease in pain to facilitate improvement in movement, function, and ADLs as indicated by Functional Deficits. []? Progressing: [x]? Met: []? Not Met: []? Adjusted          Long Term Goals: To be achieved in: 12 weeks  1. Disability index score of 20% or less for the QuickDash/Oswestry to assist with reaching prior level of function. []? Progressing: [x]? Met: []? Not Met: []? Adjusted      2. Patient will demonstrate increased AROM to equal the opposite side bilaterally to allow for proper joint functioning as indicated by patients Functional Deficits. [x]? Progressing: []? Met: []? Not Met: []? Adjusted       3. Patient will demonstrate an increase in strength to match bilaterally or be within 3lbs on the HHD to allow for proper functional mobility as indicated by patients Functional Deficits. [x]? Progressing: []? Met: []? Not Met: []? Adjusted       4. Patient will return to all transfers, work activities, and functional activities without increased symptoms or restriction. [x]? Progressing: []? Met: []? Not Met: []? Adjusted       5. Patient will have 0/10 pain with ADL's.  []? Progressing: [x]? Met: []? Not Met: []? Adjusted       6. Patient stated goal: Return to work full time with no restrictions ()  [x]? Progressing: []? Met: []? Not Met: []?  Adjusted          Overall Progression Towards Functional goals/ Treatment Progress Update:  [x] Patient is progressing as expected towards functional goals listed. [] Progression is slowed due to complexities/Impairments listed. [] Progression has been slowed due to co-morbidities. [] Plan just implemented, too soon to assess goals progression <30days   [] Goals require adjustment due to lack of progress  [] Patient is not progressing as expected and requires additional follow up with physician  [] Other    Prognosis for POC: [x] Good [] Fair  [] Poor    Patient requires continued skilled intervention: [x] Yes  [] No    Treatment/Activity Tolerance:  [x] Patient able to complete treatment  [] Patient limited by fatigue  [] Patient limited by pain    [] Patient limited by other medical complications  [] Other:                  PLAN:    [x] Continue per plan of care [] Alter current plan (see comments above)  [] Plan of care initiated [] Hold pending MD visit [] Discharge    Electronically signed by:  Corey Bishop PTA  Note: If patient does not return for scheduled/ recommended follow up visits, this note will serve as a discharge from care along with most recent update on progress.

## 2022-06-14 ENCOUNTER — HOSPITAL ENCOUNTER (OUTPATIENT)
Dept: PHYSICAL THERAPY | Age: 22
Setting detail: THERAPIES SERIES
End: 2022-06-14
Payer: COMMERCIAL

## 2022-06-21 ENCOUNTER — HOSPITAL ENCOUNTER (OUTPATIENT)
Dept: PHYSICAL THERAPY | Age: 22
Setting detail: THERAPIES SERIES
Discharge: HOME | End: 2022-06-21
Payer: COMMERCIAL

## 2022-06-21 NOTE — FLOWSHEET NOTE
[facility], [facility] ([facility])    Physical Therapy  Cancellation/No-show Note  Patient Name:  Isabella Reilly  :  2000   Date:  2022    Cancelled visits to date: 5  No-shows to date: 0    For today's appointment patient:  [x]  Cancelled  []  Rescheduled appointment  []  No-show     Reason given by patient:  []  Patient ill  []  Conflicting appointment  []  No transportation    []  Conflict with work  [x]  No reason given  []  Other:     Comments:      Phone call information:   []  Phone call made today to patient. []  Patient answered, conversation as follows:    []  Patient did not answer. [x]  Phone call not needed - pt contacted us to cancel and provided reason for cancellation.      Electronically signed by:  Vern Valadez PTA

## 2022-06-28 ENCOUNTER — HOSPITAL ENCOUNTER (OUTPATIENT)
Dept: PHYSICAL THERAPY | Age: 22
Setting detail: THERAPIES SERIES
Discharge: HOME | End: 2022-06-28
Payer: COMMERCIAL

## 2022-06-28 NOTE — FLOWSHEET NOTE
[facility]    Physical Therapy  Cancellation/No-show Note  Patient Name:  April Simms  :  2000   Date:  2022    Cancelled visits to date: 10  No-shows to date: 0    For today's appointment patient:  [x]  Cancelled  []  Rescheduled appointment  []  No-show     Reason given by patient:  []  Patient ill  []  Conflicting appointment  []  No transportation    []  Conflict with work  []  No reason given  [x]  Other:     Comments: Too sore from biodex test     Phone call information:   []  Phone call made today to patient. []  Patient answered, conversation as follows:    []  Patient did not answer. [x]  Phone call not needed - pt contacted us to cancel and provided reason for cancellation.      Electronically signed by:  Loc Barrera PTA

## 2022-07-05 ENCOUNTER — HOSPITAL ENCOUNTER (OUTPATIENT)
Dept: PHYSICAL THERAPY | Age: 22
Setting detail: THERAPIES SERIES
Discharge: HOME | End: 2022-07-05
Payer: COMMERCIAL

## 2022-07-05 PROCEDURE — 97112 NEUROMUSCULAR REEDUCATION: CPT | Performed by: PHYSICAL THERAPY ASSISTANT

## 2022-07-05 PROCEDURE — 97110 THERAPEUTIC EXERCISES: CPT | Performed by: PHYSICAL THERAPY ASSISTANT

## 2022-07-05 NOTE — FLOWSHEET NOTE
[address], [address] [place], [place], [address]  Phone: [phone], [phone]        Date: 2022          Patient Name; :  Beverly Spears; 2000   Dx/ICD Code: Left Shoulder Labrum Repair / Replissage (22)  Treatment Diagnosis: S43.00D       Physician:  Grzegorz Ny        Total PT Visits: 10     Measures Previous Current   Pain (0-10)  soreness   Disability % 55% 16%        AROM     Shoulder flexion  180   Shoulder ER  T4   Shoulder IR  L1             Strength     Shoulder Flexion  5   Shoulder ER  5   Shoulder IR  5          Specific Functional Improvements & Impressions:  Patient reports minimal pain or sorenss. States that MD would like for him to continue 1x week for a month. Plan & Recommendations:  [x] Continue rehabilitation due to objective improvement and continued functional deficits with frequency and duration: 1x week - 4 weeks  [] Progress toward  []GAP, []Work Conditioning, []Independent HEP   [] Discharge due to   [] All goals achieved, [] Maximized \"medical necessity\" [] No subjective or objective improvements      Electronically signed by:  Clayton Lazo PTA; Kathy Palacios PT,MPT,ATC, cert DN     Therapy Plan of Care Re-Certification  This patient has been re-evaluated for physical therapy services and for therapy to continue, Medicare, Medicaid and other insurances require periodic physician review of the treatment plan.  Please review the above re-evaluation and verify that you agree with plan of care as established above by signing the attached document and return it to our office or note changes to established plan below  [] Follow treatment plan as above [] Discontinue physical therapy  [] Change plan to:                                 __________________________________________________    Physician Signature:____________________________________ Date:____________  By signing above, therapists plan is approved by physician    If you have any questions or concerns, please don't hesitate to call.   Thank you for your referral.    [address] office  ([phone])     Fax [phone]       Physical Therapy Treatment Note/ Progress Report:     Date:  2022    Patient Name:  Suzanne Petty    :  2000  MRN: [id number]  Restrictions/Precautions:    Medical/Treatment Diagnosis Information:  Diagnosis: Left Shoulder Labrum Repair / Natalya Phoenix (22)  Treatment Diagnosis: Y70.95V  Insurance/Certification information:  PT Insurance Information: Ruthann  Physician Information:  Referring Practitioner: Dr. Rivera Conception  Has the plan of care been signed (Y/N):        [x]  Yes  []  No     Date of Patient follow up with Physician: 22    Is this a Progress Report:     [x]  Yes  []  No      If Yes:  Date Range for reporting period:  Initial Eval: 2022  Beginnin2022 --- Endin22  Beginnin2022 ---- Endin22  Beginnin2022 ---- Endin22  Beginnin2022 --- Endin2022  Beginnin22 ---- Ending 22        Progress report will be due (10 Rx or 30 days whichever is less):     Recertification will be due (POC Duration  / 90 days whichever is less): 22      Visit # Insurance Allowable Auth Required   In Person 10 30 (does not need auth) []  Yes     [x]  No    Tele Health 0  []  Yes     []  No    Total 10       Functional Scale: Quick Dash/Modified Oswestry: 55% (Total Number Sum: 35)   Date assessed: 2022   Functional Scale: Quick Dash/Modified Oswestry: 35%       Date assessed: 2022  Functional Scale:  Quick Dash:  14% (Total 17/35)       Date:  2022  Functional Scale: Quick Dash: 16% (total 18/55)       22    Latex Allergy:  [x]NO      []YES  Preferred Language for Healthcare:   [x]English       []other:    Pain level:  0-1/10     SUBJECTIVE:  (Patient is 12 weeks post op on 2022) See updated progress note.    OBJECTIVE:   5/23/2022 Flexion 164, , IR T5, ER T3   Flexion   Observation: overall hypomobile GH mobs  Test measurements:   AROM SFLX 160  SABD 130    SER @ side 70   SIR behind back T10  MMT  FLX/IR/ER 4 to 4+/5       RESTRICTIONS/PRECAUTIONS: Frequent hx of seizures     **Protocal in media file**  EXERCISES FOR WEEKS 7-9 ? Exercises/Interventions:   Therapeutic Ex (69466)  NMR re-education (97994) Sets/Reps Notes/CUES   P[place]ley 5 min         Ball roll up wall 2# x20    Functional Lift 2# x20    Supine cane scaption 10 x 10\" HEP   Supine cane ER  Supine shoulder flexion  Supine punch  Side lying ER  Side lying abd 10 x 10\"  2# x20  2# x30  2# x20  1# x20 HEP ? TB rows w scap set BTB 3\"x30 Difficulty w scap set - HEP   TB ext w scap set BTB 3\" x30 difficutly w scap set - HEP   TB ER/IR  GTB x20 ea HEP        PRE flexion/scaption x20 ea     Wall push ups x30    Ball on wall x20 ea ??, ??, CW/CCW green plyo             Prone Row / Extension  2# x20 / 2# x20    Prone Hor ABD  x20                                                 Pt ed x5' Review and update HEP, progression and expectations. Provided instruction in shoulder safe zone priniciples. Manual Intervention (20254)          GI-II distraction/oscillation/xcircumduction for relaxation and pain control x8'    PROM IR/ER/FLX 8'                   Access Code [email or web address]                    Patient Education             Therapeutic Exercise and NMR EXR  [x] (63478) Provided verbal/tactile cueing for activities related to strengthening, flexibility, endurance, ROM  for improvements in scapular, scapulothoracic and UE control with self care, reaching, carrying, lifting, house/yardwork, driving/computer work.     [x] (75936) Provided verbal/tactile cueing for activities related to improving balance, coordination, kinesthetic sense, posture, motor skill, proprioception  to assist with  scapular, scapulothoracic and UE control with self care, reaching, carrying, lifting, house/yardwork, driving/computer work. Therapeutic Activities:    [] (90123 or 90622) Provided verbal/tactile cueing for activities related to improving balance, coordination, kinesthetic sense, posture, motor skill, proprioception and motor activation to allow for proper function of scapular, scapulothoracic and UE control with self care, carrying, lifting, driving/computer work.      Home Exercise Program:    [x] (06820) Reviewed/Progressed HEP activities related to strengthening, flexibility, endurance, ROM of scapular, scapulothoracic and UE control with self care, reaching, carrying, lifting, house/yardwork, driving/computer work  [x] (30127) Reviewed/Progressed HEP activities related to improving balance, coordination, kinesthetic sense, posture, motor skill, proprioception of scapular, scapulothoracic and UE control with self care, reaching, carrying, lifting, house/yardwork, driving/computer work      Manual Treatments:  PROM / STM / Oscillations-Mobs:  G-I, II, III, IV (PA's, Inf., Post.)  [x] (37772) Provided manual therapy to mobilize soft tissue/joints of cervical/CT, scapular GHJ and UE for the purpose of modulating pain, promoting relaxation,  increasing ROM, reducing/eliminating soft tissue swelling/inflammation/restriction, improving soft tissue extensibility and allowing for proper ROM for normal function with self care, reaching, carrying, lifting, house/yardwork, driving/computer work    Modalities:      Charges:  Timed Code Treatment Minutes: 55   Total Treatment Minutes:  55   BWC:  TE TIME:  NMR TIME:  MANUAL TIME:  UNTIMED MINUTES:  Medicare Total:                 [] EVAL (LOW) 69200 (typically 20 minutes face-to-face)  [] EVAL (MOD) 20705 (typically 30 minutes face-to-face)  [] EVAL (HIGH) 22112 (typically 45 minutes face-to-face)  [] RE-EVAL     [x] BM(02202) x 2    [] IONTO  [x] NMR (76597) x 1     [] VASO  [x] Manual (26992) x 1    [] Other:  [] TA x      [] [facility] Traction (77900)  [] ES(attended) (22014)     [] ES (un) (07254):    ASSESSMENT:    Pt tolerated session well. He had not been in due to being ill. Improved ROM and strength this visit. Patient will benefit from continued skilled intervention to increase ROM, flexibility, strength and function. GOALS:   Short Term Goals: To be achieved in: 2 weeks  1. Independent in HEP and progression per patient tolerance, in order to prevent re-injury. [] Progressing: [x] Met: [] Not Met: [] Adjusted       2. Patient will have a decrease in pain to facilitate improvement in movement, function, and ADLs as indicated by Functional Deficits. [] Progressing: [x] Met: [] Not Met: [] Adjusted          Long Term Goals: To be achieved in: 12 weeks  1. Disability index score of 20% or less for the QuickDash/Oswestry to assist with reaching prior level of function. [] Progressing: [x] Met: [] Not Met: [] Adjusted      2. Patient will demonstrate increased AROM to equal the opposite side bilaterally to allow for proper joint functioning as indicated by patients Functional Deficits. [x] Progressing: [x] Met: [] Not Met: [] Adjusted       3. Patient will demonstrate an increase in strength to match bilaterally or be within 3lbs on the HHD to allow for proper functional mobility as indicated by patients Functional Deficits. [x] Progressing: [] Met: [] Not Met: [] Adjusted       4. Patient will return to all transfers, work activities, and functional activities without increased symptoms or restriction. [x] Progressing: [] Met: [] Not Met: [] Adjusted       5. Patient will have 0/10 pain with ADL's.  [] Progressing: [x] Met: [] Not Met: [] Adjusted       6.  Patient stated goal: Return to work full time with no restrictions ()  [x] Progressing: [] Met: [] Not Met: [] Adjusted          Overall Progression Towards Functional goals/ Treatment Progress Update:  [x] Patient is progressing as expected towards functional goals listed. [] Progression is slowed due to complexities/Impairments listed. [] Progression has been slowed due to co-morbidities. [] Plan just implemented, too soon to assess goals progression <30days   [] Goals require adjustment due to lack of progress  [] Patient is not progressing as expected and requires additional follow up with physician  [] Other    Prognosis for POC: [x] Good [] Fair  [] Poor    Patient requires continued skilled intervention: [x] Yes  [] No    Treatment/Activity Tolerance:  [x] Patient able to complete treatment  [] Patient limited by fatigue  [] Patient limited by pain    [] Patient limited by other medical complications  [] Other:                  PLAN:    [x] Continue per plan of care [] Alter current plan (see comments above)  [] Plan of care initiated [] Hold pending MD visit [] Discharge    Electronically signed by:  Bart Tovar PTA; Nikia Joseph PT,MPT,ATC, cert DN     Note: If patient does not return for scheduled/ recommended follow up visits, this note will serve as a discharge from care along with most recent update on progress.

## 2022-07-11 ENCOUNTER — HOSPITAL ENCOUNTER (OUTPATIENT)
Dept: PHYSICAL THERAPY | Age: 22
Setting detail: THERAPIES SERIES
Discharge: HOME | End: 2022-07-11
Payer: COMMERCIAL

## 2022-07-11 PROCEDURE — 97140 MANUAL THERAPY 1/> REGIONS: CPT | Performed by: PHYSICAL THERAPY ASSISTANT

## 2022-07-11 PROCEDURE — 97110 THERAPEUTIC EXERCISES: CPT | Performed by: PHYSICAL THERAPY ASSISTANT

## 2022-07-11 PROCEDURE — 97112 NEUROMUSCULAR REEDUCATION: CPT | Performed by: PHYSICAL THERAPY ASSISTANT

## 2022-07-11 NOTE — FLOWSHEET NOTE
[address] [facility] and [address] [place] [address] [address] [place] [address], [address] [place] [address]  Phone: [phone], [phone]      Physical Therapy Treatment Note/ Progress Report:     Date:  2022    Patient Name:  Leydi Zabala    :  2000  MRN: [id number]  Restrictions/Precautions:    Medical/Treatment Diagnosis Information:  · Diagnosis: Left Shoulder Labrum Repair / Replissage (22)  · Treatment Diagnosis: F87.23H  Insurance/Certification information:  PT Insurance Information: Ruthann  Physician Information:  Referring Practitioner: Dr. Kevin Modi  Has the plan of care been signed (Y/N):        [x]  Yes  []  No     Date of Patient follow up with Physician: 22    Is this a Progress Report:     [x]  Yes  []  No      If Yes:  Date Range for reporting period:  Initial Eval: 2022  Beginnin2022 --- Endin22  Beginnin2022 ---- Endin22  Beginnin2022 ---- Endin22  Beginnin2022 --- Endin2022        Progress report will be due (10 Rx or 30 days whichever is less):     Recertification will be due (POC Duration  / 90 days whichever is less): 22      Visit # Insurance Allowable Auth Required   In Person 10 30 (does not need auth) []  Yes     [x]  No    Tele Health 0  []  Yes     []  No    Total 10       Functional Scale: Quick Dash/Modified Oswestry: 55% (Total Number Sum: 35)   Date assessed: 2022   Functional Scale: Quick Dash/Modified Oswestry: 35%       Date assessed: 2022  Functional Scale:  Quick Dash:  14% (Total 17/35)       Date:  2022  Functional Scale:  Quick Dash:  16% (Total 18/35)       Date:  2022    Latex Allergy:  [x]NO      []YES  Preferred Language for Healthcare:   [x]English       []other:    Pain level:  0-1/10     SUBJECTIVE:  (Patient is 12 weeks post op on 2022) Patient reports that he is doing pretty well - no new complaints reported.  He does still get weakness when he holds something in his hand for a prolonged period.  OBJECTIVE:    5/23/2022 Flexion 164, , IR T5, ER T3   o Flexion    Observation: overall hypomobile GH mobs   Test measurements:   AROM SFLX 160  SABD 130    SER @ side 70   SIR behind back T10   MMT  FLX/IR/ER 4 to 4+/5       RESTRICTIONS/PRECAUTIONS: Frequent hx of seizures     **Protocal in media file**  EXERCISES FOR WEEKS 7-9 ? Exercises/Interventions:   Therapeutic Ex (63951)  NMR re-education (66056) Sets/Reps Notes/CUES   P[place]ley 5 min         Ball roll up wall 2# x20    Functional Lift 2# x20    Supine cane scaption 10x10\" HEP   Supine cane ER  Supine shoulder flexion  Supine punch  Side lying ER  Side lying abd 10x10\"  2# x20  2# x30  2# x20  1# x20 HEP ? TB rows w scap set BTB 3\"x30 Difficulty w scap set - HEP   TB ext w scap set BTB 3\" x30 difficutly w scap set - HEP   TB ER/IR  GTB x20 ea HEP        PRE flexion/scaption x20 ea     Wall push ups x30    Ball on wall x20 ea ??, ??, CW/CCW green plyo             Prone Row / Extension  2# x20 / 2# x20    Prone Hor ABD  2# x20                                                 Pt ed x5' Review and update HEP, progression and expectations. Provided instruction in shoulder safe zone priniciples. Manual Intervention (06349)          GI-II distraction/oscillation/xcircumduction for relaxation and pain control x8'    PROM IR/ER/FLX 8'                   Access Code [email or web address] HEP                    Patient Education             Therapeutic Exercise and NMR EXR  [x] (21530) Provided verbal/tactile cueing for activities related to strengthening, flexibility, endurance, ROM  for improvements in scapular, scapulothoracic and UE control with self care, reaching, carrying, lifting, house/yardwork, driving/computer work.     [x] (20817) Provided verbal/tactile cueing for activities related to improving balance, coordination, kinesthetic sense, posture, motor skill, proprioception  to assist with  scapular, scapulothoracic and UE control with self care, reaching, carrying, lifting, house/yardwork, driving/computer work. Therapeutic Activities:    [] (90461 or 75797) Provided verbal/tactile cueing for activities related to improving balance, coordination, kinesthetic sense, posture, motor skill, proprioception and motor activation to allow for proper function of scapular, scapulothoracic and UE control with self care, carrying, lifting, driving/computer work.      Home Exercise Program:    [x] (00238) Reviewed/Progressed HEP activities related to strengthening, flexibility, endurance, ROM of scapular, scapulothoracic and UE control with self care, reaching, carrying, lifting, house/yardwork, driving/computer work  [x] (54392) Reviewed/Progressed HEP activities related to improving balance, coordination, kinesthetic sense, posture, motor skill, proprioception of scapular, scapulothoracic and UE control with self care, reaching, carrying, lifting, house/yardwork, driving/computer work      Manual Treatments:  PROM / STM / Oscillations-Mobs:  G-I, II, III, IV (PA's, Inf., Post.)  [x] (28062) Provided manual therapy to mobilize soft tissue/joints of cervical/CT, scapular GHJ and UE for the purpose of modulating pain, promoting relaxation,  increasing ROM, reducing/eliminating soft tissue swelling/inflammation/restriction, improving soft tissue extensibility and allowing for proper ROM for normal function with self care, reaching, carrying, lifting, house/yardwork, driving/computer work    Modalities:      Charges:  Timed Code Treatment Minutes: 55   Total Treatment Minutes:  55   BWC:  TE TIME:  NMR TIME:  MANUAL TIME:  UNTIMED MINUTES:  Medicare Total:                 [] EVAL (LOW) 34739 (typically 20 minutes face-to-face)  [] EVAL (MOD) 64669 (typically 30 minutes face-to-face)  [] EVAL (HIGH) 69172 (typically 45 minutes face-to-face)  [] RE-EVAL     [x] GB(06303) x 2    [] IONTO  [x] NMR (57484) x 1     [] VASO  [x] Manual (44657) x 1    [] Other:  [] TA x      [] Mech Traction (00826)  [] ES(attended) (35998)     [] ES (un) (83111):    ASSESSMENT:    Pt tolerated session well. He had not been in due to being ill. Improved ROM and strength this visit. Patient will benefit from continued skilled intervention to increase ROM, flexibility, strength and function. GOALS:   Short Term Goals: To be achieved in: 2 weeks  1. Independent in HEP and progression per patient tolerance, in order to prevent re-injury. []? Progressing: [x]? Met: []? Not Met: []? Adjusted       2. Patient will have a decrease in pain to facilitate improvement in movement, function, and ADLs as indicated by Functional Deficits. []? Progressing: [x]? Met: []? Not Met: []? Adjusted          Long Term Goals: To be achieved in: 12 weeks  1. Disability index score of 20% or less for the QuickDash/Oswestry to assist with reaching prior level of function. []? Progressing: [x]? Met: []? Not Met: []? Adjusted      2. Patient will demonstrate increased AROM to equal the opposite side bilaterally to allow for proper joint functioning as indicated by patients Functional Deficits. [x]? Progressing: []? Met: []? Not Met: []? Adjusted       3. Patient will demonstrate an increase in strength to match bilaterally or be within 3lbs on the HHD to allow for proper functional mobility as indicated by patients Functional Deficits. [x]? Progressing: []? Met: []? Not Met: []? Adjusted       4. Patient will return to all transfers, work activities, and functional activities without increased symptoms or restriction. [x]? Progressing: []? Met: []? Not Met: []? Adjusted       5. Patient will have 0/10 pain with ADL's.  []? Progressing: [x]? Met: []? Not Met: []? Adjusted       6. Patient stated goal: Return to work full time with no restrictions ()  []? Progressing: [x]? Met: []? Not Met: []?  Adjusted Overall Progression Towards Functional goals/ Treatment Progress Update:  [x] Patient is progressing as expected towards functional goals listed. [] Progression is slowed due to complexities/Impairments listed. [] Progression has been slowed due to co-morbidities. [] Plan just implemented, too soon to assess goals progression <30days   [] Goals require adjustment due to lack of progress  [] Patient is not progressing as expected and requires additional follow up with physician  [] Other    Prognosis for POC: [x] Good [] Fair  [] Poor    Patient requires continued skilled intervention: [x] Yes  [] No    Treatment/Activity Tolerance:  [x] Patient able to complete treatment  [] Patient limited by fatigue  [] Patient limited by pain    [] Patient limited by other medical complications  [] Other:                  PLAN:    [x] Continue per plan of care [] Alter current plan (see comments above)  [] Plan of care initiated [] Hold pending MD visit [] Discharge    Electronically signed by:  Marisol Hull PTA  Note: If patient does not return for scheduled/ recommended follow up visits, this note will serve as a discharge from care along with most recent update on progress.

## 2022-07-21 ENCOUNTER — HOSPITAL ENCOUNTER (OUTPATIENT)
Dept: PHYSICAL THERAPY | Age: 22
Setting detail: THERAPIES SERIES
End: 2022-07-21
Payer: COMMERCIAL

## 2022-07-26 ENCOUNTER — HOSPITAL ENCOUNTER (OUTPATIENT)
Dept: PHYSICAL THERAPY | Age: 22
Setting detail: THERAPIES SERIES
Discharge: HOME | End: 2022-07-26
Payer: COMMERCIAL

## 2022-07-26 PROCEDURE — 97140 MANUAL THERAPY 1/> REGIONS: CPT | Performed by: PHYSICAL THERAPY ASSISTANT

## 2022-07-26 PROCEDURE — 97112 NEUROMUSCULAR REEDUCATION: CPT | Performed by: PHYSICAL THERAPY ASSISTANT

## 2022-07-26 PROCEDURE — 97110 THERAPEUTIC EXERCISES: CPT | Performed by: PHYSICAL THERAPY ASSISTANT

## 2022-07-26 NOTE — FLOWSHEET NOTE
[address]23 [facility] and 500 [facility], [address] [place] [address]  Phone: [phone], [phone]      Physical Therapy Treatment Note/ Progress Report:     Date:  2022    Patient Name:  Kacey Hand    :  2000  MRN: [id number]  Restrictions/Precautions:    Medical/Treatment Diagnosis Information:  Diagnosis: Left Shoulder Labrum Repair / Replissage (22)  Treatment Diagnosis: F63.51F  Insurance/Certification information:  PT Insurance Information: Ruthann  Physician Information:  Referring Practitioner: Dr. Thelma Vigil  Has the plan of care been signed (Y/N):        [x]  Yes  []  No     Date of Patient follow up with Physician: 22    Is this a Progress Report:     [x]  Yes  []  No      If Yes:  Date Range for reporting period:  Initial Eval: 2022  Beginnin2022 --- Endin22  Beginnin2022 ---- Endin22  Beginnin2022 ---- Endin22  Beginnin2022 --- Endin2022  Beginnin22 ---- Ending 22        Progress report will be due (10 Rx or 30 days whichever is less): 96    Recertification will be due (POC Duration  / 90 days whichever is less): 22      Visit # Insurance Allowable Auth Required   In Person 12 30 (does not need auth) []  Yes     [x]  No    Tele Health 0  []  Yes     []  No    Total 12       Functional Scale: Quick Dash/Modified Oswestry: 55% (Total Number Sum: 35)   Date assessed: 2022   Functional Scale: Quick Dash/Modified Oswestry: 35%       Date assessed: 2022  Functional Scale:  Quick Dash:  14% (Total 17/35)       Date:  2022  Functional Scale: Nga Porras: 16% (total 18/55)       22    Latex Allergy:  [x]NO      []YES  Preferred Language for Healthcare:   [x]English       []other:    Pain level:  0-1/10     SUBJECTIVE:  (Patient is 12 weeks post op on 2022) Patient reports that his neck is hurting worse than his shoulder today.    OBJECTIVE:   5/23/2022 Flexion 164, , IR T5, ER T3   Flexion   Observation: overall hypomobile GH mobs  Test measurements:   AROM SFLX 160  SABD 130    SER @ side 70   SIR behind back T10  MMT  FLX/IR/ER 4 to 4+/5       RESTRICTIONS/PRECAUTIONS: Frequent hx of seizures     **Protocal in media file**  EXERCISES FOR WEEKS 7-9 ? Exercises/Interventions:   Therapeutic Ex (32424)  NMR re-education (60520) Sets/Reps Notes/CUES   Pulley 5 min         Ball roll up wall 2# x20    Functional Lift 4# x20    Supine cane scaption 10 x 10\" HEP   Supine cane ER  Supine shoulder flexion  Supine punch  Side lying ER  Side lying abd 10 x 10\"  3# x20  3# x30  2# x20  2# x20 HEP ? TB rows w scap set BTB 3\"x30 Difficulty w scap set - HEP   TB ext w scap set BTB 3\" x30 difficutly w scap set - HEP   TB ER/IR  GTB x20 ea HEP        PRE flexion/scaption x20 ea  1#   Wall push ups x30    Ball on wall x30 ea ??, ??, CW/CCW green plyo             Prone Row / Extension  2# x20 / 2# x20    Prone Hor ABD  x20                                                 Pt ed x5' Review and update HEP, progression and expectations. Provided instruction in shoulder safe zone priniciples. Manual Intervention (55894)          GI-II distraction/oscillation/xcircumduction for relaxation and pain control x8'    PROM IR/ER/FLX 8'                   Access Code [email or web address] HEP                    Patient Education             Therapeutic Exercise and NMR EXR  [x] (59022) Provided verbal/tactile cueing for activities related to strengthening, flexibility, endurance, ROM  for improvements in scapular, scapulothoracic and UE control with self care, reaching, carrying, lifting, house/yardwork, driving/computer work.     [x] (10860) Provided verbal/tactile cueing for activities related to improving balance, coordination, kinesthetic sense, posture, motor skill, proprioception  to assist with  scapular, scapulothoracic and UE control with self care, reaching, carrying, lifting, house/yardwork, driving/computer work. Therapeutic Activities:    [] (07018 or 82972) Provided verbal/tactile cueing for activities related to improving balance, coordination, kinesthetic sense, posture, motor skill, proprioception and motor activation to allow for proper function of scapular, scapulothoracic and UE control with self care, carrying, lifting, driving/computer work.      Home Exercise Program:    [x] (90895) Reviewed/Progressed HEP activities related to strengthening, flexibility, endurance, ROM of scapular, scapulothoracic and UE control with self care, reaching, carrying, lifting, house/yardwork, driving/computer work  [x] (56007) Reviewed/Progressed HEP activities related to improving balance, coordination, kinesthetic sense, posture, motor skill, proprioception of scapular, scapulothoracic and UE control with self care, reaching, carrying, lifting, house/yardwork, driving/computer work      Manual Treatments:  PROM / STM / Oscillations-Mobs:  G-I, II, III, IV (PA's, Inf., Post.)  [x] (08619) Provided manual therapy to mobilize soft tissue/joints of cervical/CT, scapular GHJ and UE for the purpose of modulating pain, promoting relaxation,  increasing ROM, reducing/eliminating soft tissue swelling/inflammation/restriction, improving soft tissue extensibility and allowing for proper ROM for normal function with self care, reaching, carrying, lifting, house/yardwork, driving/computer work    Modalities:      Charges:  Timed Code Treatment Minutes: 55   Total Treatment Minutes:  55   BWC:  TE TIME:  NMR TIME:  MANUAL TIME:  UNTIMED MINUTES:  Medicare Total:                 [] EVAL (LOW) 50230 (typically 20 minutes face-to-face)  [] EVAL (MOD) 22777 (typically 30 minutes face-to-face)  [] EVAL (HIGH) 96435 (typically 45 minutes face-to-face)  [] RE-EVAL     [x] QH(03716) x 2    [] IONTO  [x] NMR (84048) x 1     [] VASO  [x] Manual (28909) x 1    [] Other:  [] TA x      [] [facility] Traction (34132)  [] ES(attended) (17279)     [] ES (un) (13247):    ASSESSMENT:    Pt tolerated session well. He had not been in due to being ill. Improved ROM and strength this visit. Patient will benefit from continued skilled intervention to increase ROM, flexibility, strength and function. GOALS:   Short Term Goals: To be achieved in: 2 weeks  1. Independent in HEP and progression per patient tolerance, in order to prevent re-injury. [] Progressing: [x] Met: [] Not Met: [] Adjusted       2. Patient will have a decrease in pain to facilitate improvement in movement, function, and ADLs as indicated by Functional Deficits. [] Progressing: [x] Met: [] Not Met: [] Adjusted          Long Term Goals: To be achieved in: 12 weeks  1. Disability index score of 20% or less for the QuickDash/Oswestry to assist with reaching prior level of function. [] Progressing: [x] Met: [] Not Met: [] Adjusted      2. Patient will demonstrate increased AROM to equal the opposite side bilaterally to allow for proper joint functioning as indicated by patients Functional Deficits. [x] Progressing: [x] Met: [] Not Met: [] Adjusted       3. Patient will demonstrate an increase in strength to match bilaterally or be within 3lbs on the HHD to allow for proper functional mobility as indicated by patients Functional Deficits. [x] Progressing: [] Met: [] Not Met: [] Adjusted       4. Patient will return to all transfers, work activities, and functional activities without increased symptoms or restriction. [x] Progressing: [] Met: [] Not Met: [] Adjusted       5. Patient will have 0/10 pain with ADL's.  [] Progressing: [x] Met: [] Not Met: [] Adjusted       6.  Patient stated goal: Return to work full time with no restrictions ()  [x] Progressing: [] Met: [] Not Met: [] Adjusted          Overall Progression Towards Functional goals/ Treatment Progress Update:  [x] Patient is progressing as expected towards functional goals listed. [] Progression is slowed due to complexities/Impairments listed. [] Progression has been slowed due to co-morbidities. [] Plan just implemented, too soon to assess goals progression <30days   [] Goals require adjustment due to lack of progress  [] Patient is not progressing as expected and requires additional follow up with physician  [] Other    Prognosis for POC: [x] Good [] Fair  [] Poor    Patient requires continued skilled intervention: [x] Yes  [] No    Treatment/Activity Tolerance:  [x] Patient able to complete treatment  [] Patient limited by fatigue  [] Patient limited by pain    [] Patient limited by other medical complications  [] Other:                  PLAN:    [x] Continue per plan of care [] Alter current plan (see comments above)  [] Plan of care initiated [] Hold pending MD visit [] Discharge    Electronically signed by:  Stefanie Barkley PTA  Note: If patient does not return for scheduled/ recommended follow up visits, this note will serve as a discharge from care along with most recent update on progress.

## 2022-08-02 ENCOUNTER — HOSPITAL ENCOUNTER (OUTPATIENT)
Dept: PHYSICAL THERAPY | Age: 22
Setting detail: THERAPIES SERIES
Discharge: HOME | End: 2022-08-02
Payer: COMMERCIAL

## 2022-08-02 PROCEDURE — 97112 NEUROMUSCULAR REEDUCATION: CPT | Performed by: PHYSICAL THERAPY ASSISTANT

## 2022-08-02 PROCEDURE — 97110 THERAPEUTIC EXERCISES: CPT | Performed by: PHYSICAL THERAPY ASSISTANT

## 2022-08-02 PROCEDURE — 97140 MANUAL THERAPY 1/> REGIONS: CPT | Performed by: PHYSICAL THERAPY ASSISTANT

## 2022-08-02 NOTE — FLOWSHEET NOTE
[address]23 [facility] and [address] [place] [address], [address] [place] [address] 904 [facility], [address] [place], [place], [address] [place] [address]  Phone: [phone], [phone]  Date: 2022          Patient Name; :  Raquel Ngo; 2000   Dx/ICD Code: Diagnosis: Left Shoulder Labrum Repair / Gee Thomas (22)  Treatment Diagnosis: S43.00D       Physician:  Dr. Yoel White        Total PT Visits: 13     Measures Previous Current   Pain (0-10) 3/10 0-1/10   Disability % Quick Dash:  55% (35/55 Sum)  Quick Dash:  14% (Sum 17/55)   FOTO Score     ROM Flexion  90 degrees Flexion 176,     Shoulder ER 15 ER T4, IR T4   Strength  Flexion 4+/5     ABD 5/5     ER 5/5, IR 5/5      Specific Functional Improvements & Impressions:  Rose Marie Bynum states shoulder is doing well. He reports his neck is a little sore but really only when he is driving. He is still limiting the amount of items he carries at work as he is waiting for full release from MD after his next two PT visits. He reports an intermittent cold feeling in the arm but reports it typically abolishes quickly. He does get muscle fatigue/soreness with therapy but states this is abolished within an hour. Plan & Recommendations:  [x] Continue rehabilitation due to objective improvement and continued functional deficits with frequency and duration: 2 more visits prior to return to see MD and then plan to D/C   [] Progress toward  []GAP, []Work Conditioning, []Independent HEP   [] Discharge due to   [] All goals achieved, [] Maximized \"medical necessity\" [] No subjective or objective improvements      Electronically signed by:  Jaziel Mooney PTA  Therapy Plan of Care Re-Certification  This patient has been re-evaluated for physical therapy services and for therapy to continue, Medicare, Medicaid and other insurances require periodic physician review of the treatment plan.  Please review the above re-evaluation and verify that you agree with plan of care as established above by signing the attached document and return it to our office or note changes to established plan below  [] Follow treatment plan as above [] Discontinue physical therapy  [] Change plan to:                                 __________________________________________________    Physician Signature:____________________________________ Date:____________  By signing above, therapists plan is approved by physician    If you have any questions or concerns, please don't hesitate to call.   Thank you for your referral.    [facility] [address] office  ([phone])     Fax [phone]       Physical Therapy Treatment Note/ Progress Report:     Date:  2022    Patient Name:  Jeff Gaston    :  2000  MRN: [id number]  Restrictions/Precautions:    Medical/Treatment Diagnosis Information:  Diagnosis: Left Shoulder Labrum Repair / Replissage (22)  Treatment Diagnosis: G99.43Q  Insurance/Certification information:  PT Insurance Information: Ruthann  Physician Information:  Referring Practitioner: Dr. Rula Restrepo  Has the plan of care been signed (Y/N):        [x]  Yes  []  No     Date of Patient follow up with Physician: 22    Is this a Progress Report:     [x]  Yes  []  No      If Yes:  Date Range for reporting period:  Initial Eval: 2022  Beginnin2022 --- Endin22  Beginnin2022 ---- Endin22  Beginnin2022 ---- Endin22  Beginnin2022 --- Endin2022  Beginnin22 ---- Ending 22  Beginnin22 --- Endin22        Progress report will be due (10 Rx or 30 days whichever is less): 82    Recertification will be due (POC Duration  / 90 days whichever is less): 22      Visit # Insurance Allowable Auth Required   In Person 13 30 (does not need auth) []  Yes     [x]  No    Tele Health 0  []  Yes     []  No    Total 13       Functional Scale: Quick Dash/Modified Oswestry: 55% (Total Number Sum: 35)   Date assessed: 2022   Functional Scale: Quick Dash/Modified Oswestry: 35%       Date assessed: 4/7/2022  Functional Scale:  Quick Dash:  14% (Total 17/35)       Date:  5/23/2022  Functional Scale: Quick Dash: 16% (total 18/55)       7/5/22  Fucntional Scale:  Victor Hugo Brocke: 14% (Totatl 17/55)      8/2/00    Latex Allergy:  [x]NO      []YES  Preferred Language for Healthcare:   [x]English       []other:    Pain level:  0-1/10     SUBJECTIVE:  (Patient is 12 weeks post op on 5/12/2022) States shoulder is doing well. Reports his neck is a little sore but really only when he is driving. Still limiting the amount of items he carries at work - waiting for full release from MD after his next two PT visits. OBJECTIVE:   8/2/2022  Flexion 176, , IR T4, ER T4   Strength:  Flexion 4+/5, ABD 5/5, IR 5/5, ER 5/5     5/23/2022 Flexion 164, , IR T5, ER T3   Flexion   Observation: overall hypomobile GH mobs  Test measurements:   AROM SFLX 160  SABD 130    SER @ side 70   SIR behind back T10  MMT  FLX/IR/ER 4 to 4+/5       RESTRICTIONS/PRECAUTIONS: Frequent hx of seizures     **Protocal in media file**  EXERCISES FOR WEEKS 7-9 ? Exercises/Interventions:   Therapeutic Ex (28276)  NMR re-education (31606) Sets/Reps Notes/CUES   Pulley 5 min         Ball roll up wall 2# x20    Functional Lift 4# x20    Supine cane scaption 10 x 10\" HEP   Supine cane ER  Supine shoulder flexion  Supine punch  Side lying ER  Side lying abd 10 x 10\"  3# x20  3# x30  2# x20  2# x20 HEP ? TB rows w scap set BTB 3\"x30 Difficulty w scap set - HEP   TB ext w scap set BTB 3\" x30 difficutly w scap set - HEP   TB ER/IR  GTB x20 ea HEP        PRE flexion/scaption x20 ea  1#   Wall push ups x30    Ball on wall x30 ea ??, ??, CW/CCW green plyo             Prone Row / Extension  2# x20 / 2# x20    Prone Hor ABD  x20              UT stretch  3x30\"    Levator stretch  3x30\"                             Pt ed x5' Review and update HEP, progression and expectations. Provided instruction in shoulder safe zone priniciples. Manual Intervention (43559)          GI-II distraction/oscillation/xcircumduction for relaxation and pain control x8'    PROM IR/ER/FLX 8'                   Access Code [email or web address] HEP                    Patient Education             Therapeutic Exercise and NMR EXR  [x] (90077) Provided verbal/tactile cueing for activities related to strengthening, flexibility, endurance, ROM  for improvements in scapular, scapulothoracic and UE control with self care, reaching, carrying, lifting, house/yardwork, driving/computer work. [x] (92368) Provided verbal/tactile cueing for activities related to improving balance, coordination, kinesthetic sense, posture, motor skill, proprioception  to assist with  scapular, scapulothoracic and UE control with self care, reaching, carrying, lifting, house/yardwork, driving/computer work. Therapeutic Activities:    [] (93399 or 89350) Provided verbal/tactile cueing for activities related to improving balance, coordination, kinesthetic sense, posture, motor skill, proprioception and motor activation to allow for proper function of scapular, scapulothoracic and UE control with self care, carrying, lifting, driving/computer work.      Home Exercise Program:    [x] (42130) Reviewed/Progressed HEP activities related to strengthening, flexibility, endurance, ROM of scapular, scapulothoracic and UE control with self care, reaching, carrying, lifting, house/yardwork, driving/computer work  [x] (17636) Reviewed/Progressed HEP activities related to improving balance, coordination, kinesthetic sense, posture, motor skill, proprioception of scapular, scapulothoracic and UE control with self care, reaching, carrying, lifting, house/yardwork, driving/computer work      Manual Treatments:  PROM / STM / Oscillations-Mobs:  G-I, II, III, IV (PA's, Inf., Post.)  [x] (73641) Provided manual therapy to mobilize soft tissue/joints of cervical/CT, scapular GHJ and UE for the purpose of modulating pain, promoting relaxation,  increasing ROM, reducing/eliminating soft tissue swelling/inflammation/restriction, improving soft tissue extensibility and allowing for proper ROM for normal function with self care, reaching, carrying, lifting, house/yardwork, driving/computer work    Modalities:      Charges:  Timed Code Treatment Minutes: 55   Total Treatment Minutes:  55   BWC:  TE TIME:  NMR TIME:  MANUAL TIME:  UNTIMED MINUTES:  Medicare Total:                 [] EVAL (LOW) 12262 (typically 20 minutes face-to-face)  [] EVAL (MOD) 16729 (typically 30 minutes face-to-face)  [] EVAL (HIGH) 27399 (typically 45 minutes face-to-face)  [] RE-EVAL     [x] SZ(77784) x 2    [] IONTO  [x] NMR (61234) x 1     [] VASO  [x] Manual (21747) x 1    [] Other:  [] TA x      [] Mech Traction (07155)  [] ES(attended) (59233)     [] ES (un) (68593):    ASSESSMENT:    Pt tolerated session well. He had not been in due to being ill. Improved ROM and strength this visit. Patient will benefit from continued skilled intervention to increase ROM, flexibility, strength and function. GOALS:   Short Term Goals: To be achieved in: 2 weeks  1. Independent in HEP and progression per patient tolerance, in order to prevent re-injury. [] Progressing: [x] Met: [] Not Met: [] Adjusted       2. Patient will have a decrease in pain to facilitate improvement in movement, function, and ADLs as indicated by Functional Deficits. [] Progressing: [x] Met: [] Not Met: [] Adjusted          Long Term Goals: To be achieved in: 12 weeks  1. Disability index score of 20% or less for the QuickDash/Oswestry to assist with reaching prior level of function. [] Progressing: [x] Met: [] Not Met: [] Adjusted      2. Patient will demonstrate increased AROM to equal the opposite side bilaterally to allow for proper joint functioning as indicated by patients Functional Deficits.    [x] Progressing: [x] Met: [] Not Met: [] Adjusted       3. Patient will demonstrate an increase in strength to match bilaterally or be within 3lbs on the HHD to allow for proper functional mobility as indicated by patients Functional Deficits. [x] Progressing: [] Met: [] Not Met: [] Adjusted       4. Patient will return to all transfers, work activities, and functional activities without increased symptoms or restriction. [x] Progressing: [] Met: [] Not Met: [] Adjusted       5. Patient will have 0/10 pain with ADL's.  [] Progressing: [x] Met: [] Not Met: [] Adjusted       6. Patient stated goal: Return to work full time with no restrictions ()  [x] Progressing: [] Met: [] Not Met: [] Adjusted          Overall Progression Towards Functional goals/ Treatment Progress Update:  [x] Patient is progressing as expected towards functional goals listed. [] Progression is slowed due to complexities/Impairments listed. [] Progression has been slowed due to co-morbidities. [] Plan just implemented, too soon to assess goals progression <30days   [] Goals require adjustment due to lack of progress  [] Patient is not progressing as expected and requires additional follow up with physician  [] Other    Prognosis for POC: [x] Good [] Fair  [] Poor    Patient requires continued skilled intervention: [x] Yes  [] No    Treatment/Activity Tolerance:  [x] Patient able to complete treatment  [] Patient limited by fatigue  [] Patient limited by pain    [] Patient limited by other medical complications  [] Other:                  PLAN:    [x] Continue per plan of care [] Alter current plan (see comments above)  [] Plan of care initiated [] Hold pending MD visit [] Discharge    Electronically signed by:  Caden Abbott PTA  Note: If patient does not return for scheduled/ recommended follow up visits, this note will serve as a discharge from care along with most recent update on progress.

## 2022-08-02 NOTE — FLOWSHEET NOTE
[address] [facility] and [facility], [facility], [address] [place] [address] ([facility]), [address]  Phone: [phone], [phone]  Date: 2022          Patient Name; :  Janet Lopez; 2000   Dx/ICD Code: Diagnosis: Left Shoulder Labrum Repair / Authur Carlos Alberto (22)  Treatment Diagnosis: S43.00D       Physician:  Dr. Gurwinder Ledesma        Total PT Visits: 13     Measures Previous Current   Pain (0-10) 3/10 0-1/10   Disability % Quick Dash:  55% (35/55 Sum)  Quick Dash:  14% (Sum 17/55)   FOTO Score     ROM Flexion  90 degrees Flexion 176,     Shoulder ER 15 ER T4, IR T4   Strength  Flexion 4+/5     ABD 5/5     ER 5/5, IR 5/5      Specific Functional Improvements & Impressions:  Jennifer Arriaga states shoulder is doing well. He reports his neck is a little sore but really only when he is driving. He is still limiting the amount of items he carries at work as he is waiting for full release from MD after his next two PT visits. He reports an intermittent cold feeling in the arm but reports it typically abolishes quickly. He does get muscle fatigue/soreness with therapy but states this is abolished within an hour. Plan & Recommendations:  [x] Continue rehabilitation due to objective improvement and continued functional deficits with frequency and duration: 2 more visits prior to return to see MD and then plan to D/C   [] Progress toward  []GAP, []Work Conditioning, []Independent HEP   [] Discharge due to   [] All goals achieved, [] Maximized \"medical necessity\" [] No subjective or objective improvements      Electronically signed by:  Cindy Dodson, PTA; Sukhdev Lewis PT,MPT,ATC, cert DN     Therapy Plan of Care Re-Certification  This patient has been re-evaluated for physical therapy services and for therapy to continue, Medicare, Medicaid and other insurances require periodic physician review of the treatment plan.  Please review the above re-evaluation and verify that you agree with plan of care as established above by signing the attached document and return it to our office or note changes to established plan below  [] Follow treatment plan as above [] Discontinue physical therapy  [] Change plan to:                                 __________________________________________________    Physician Signature:____________________________________ Date:____________  By signing above, therapists plan is approved by physician    If you have any questions or concerns, please don't hesitate to call.   Thank you for your referral.    [address] office  ([phone])     Fax [phone]       Physical Therapy Treatment Note/ Progress Report:     Date:  2022    Patient Name:  Christine Alejo    :  2000  MRN: [id number]  Restrictions/Precautions:    Medical/Treatment Diagnosis Information:  Diagnosis: Left Shoulder Labrum Repair / Replissage (22)  Treatment Diagnosis: G21.47F  Insurance/Certification information:  PT Insurance Information: Ruthann  Physician Information:  Referring Practitioner: Dr. Radha Joyce  Has the plan of care been signed (Y/N):        [x]  Yes  []  No     Date of Patient follow up with Physician: 22    Is this a Progress Report:     [x]  Yes  []  No      If Yes:  Date Range for reporting period:  Initial Eval: 2022  Beginnin2022 --- Endin22  Beginnin2022 ---- Endin22  Beginnin2022 ---- Endin22  Beginnin2022 --- Endin2022  Beginnin22 ---- Ending 22  Beginnin22 --- Endin22        Progress report will be due (10 Rx or 30 days whichever is less): 43    Recertification will be due (POC Duration  / 90 days whichever is less): 22      Visit # Insurance Allowable Auth Required   In Person 16 33 (does not need auth) []  Yes     [x]  No    [facility] 0  []  Yes     []  No    Total 13       Functional Scale: Quick Dash/Modified Oswestry: 55% (Total Number Sum: 35)   Date assessed: 2/22/2022   Functional Scale: Quick Dash/Modified Oswestry: 35%       Date assessed: 4/7/2022  Functional Scale:  Quick Dash:  14% (Total 17/35)       Date:  5/23/2022  Functional Scale: Quick Dash: 16% (total 18/55)       7/5/22  Fucntional Scale:  Katy Sortoin: 14% (Totatl 17/55)      8/2/00    Latex Allergy:  [x]NO      []YES  Preferred Language for Healthcare:   [x]English       []other:    Pain level:  0-1/10     SUBJECTIVE:  (Patient is 12 weeks post op on 5/12/2022) States shoulder is doing well. Reports his neck is a little sore but really only when he is driving. Still limiting the amount of items he carries at work - waiting for full release from MD after his next two PT visits. OBJECTIVE:   8/2/2022  Flexion 176, , IR T4, ER T4   Strength:  Flexion 4+/5, ABD 5/5, IR 5/5, ER 5/5     5/23/2022 Flexion 164, , IR T5, ER T3   Flexion   Observation: overall hypomobile GH mobs  Test measurements:   AROM SFLX 160  SABD 130    SER @ side 70   SIR behind back T10  MMT  FLX/IR/ER 4 to 4+/5       RESTRICTIONS/PRECAUTIONS: Frequent hx of seizures     **Protocal in media file**  EXERCISES FOR WEEKS 7-9 ? Exercises/Interventions:   Therapeutic Ex (44522)  NMR re-education (86738) Sets/Reps Notes/CUES   Pulley 5 min         Ball roll up wall 2# x20    Functional Lift 4# x20    Supine cane scaption 10 x 10\" HEP   Supine cane ER  Supine shoulder flexion  Supine punch  Side lying ER  Side lying abd 10 x 10\"  3# x20  3# x30  2# x20  2# x20 HEP ?               TB rows w scap set BTB 3\"x30 Difficulty w scap set - HEP   TB ext w scap set BTB 3\" x30 difficutly w scap set - HEP   TB ER/IR  GTB x20 ea HEP        PRE flexion/scaption x20 ea  1#   Wall push ups x30    Ball on wall x30 ea ??, ??, CW/CCW green plyo             Prone Row / Extension  2# x20 / 2# x20    Prone Hor ABD  x20              UT stretch  3x30\"    Levator stretch  3x30\"                             Pt ed x5' Review and update HEP, progression and expectations. Provided instruction in shoulder safe zone priniciples. Manual Intervention (08899)          GI-II distraction/oscillation/xcircumduction for relaxation and pain control x8'    PROM IR/ER/FLX 8'                   Access Code [email or web address] HEP                    Patient Education             Therapeutic Exercise and NMR EXR  [x] (78858) Provided verbal/tactile cueing for activities related to strengthening, flexibility, endurance, ROM  for improvements in scapular, scapulothoracic and UE control with self care, reaching, carrying, lifting, house/yardwork, driving/computer work. [x] (88225) Provided verbal/tactile cueing for activities related to improving balance, coordination, kinesthetic sense, posture, motor skill, proprioception  to assist with  scapular, scapulothoracic and UE control with self care, reaching, carrying, lifting, house/yardwork, driving/computer work. Therapeutic Activities:    [] (04094 or 19696) Provided verbal/tactile cueing for activities related to improving balance, coordination, kinesthetic sense, posture, motor skill, proprioception and motor activation to allow for proper function of scapular, scapulothoracic and UE control with self care, carrying, lifting, driving/computer work.      Home Exercise Program:    [x] (93078) Reviewed/Progressed HEP activities related to strengthening, flexibility, endurance, ROM of scapular, scapulothoracic and UE control with self care, reaching, carrying, lifting, house/yardwork, driving/computer work  [x] (20361) Reviewed/Progressed HEP activities related to improving balance, coordination, kinesthetic sense, posture, motor skill, proprioception of scapular, scapulothoracic and UE control with self care, reaching, carrying, lifting, house/yardwork, driving/computer work      Manual Treatments:  PROM / STM / Oscillations-Mobs:  G-I, II, III, IV (PA's, Inf., Post.)  [x] (10127) Provided manual therapy to mobilize soft tissue/joints of cervical/CT, scapular GHJ and UE for the purpose of modulating pain, promoting relaxation,  increasing ROM, reducing/eliminating soft tissue swelling/inflammation/restriction, improving soft tissue extensibility and allowing for proper ROM for normal function with self care, reaching, carrying, lifting, house/yardwork, driving/computer work    Modalities:      Charges:  Timed Code Treatment Minutes: 55   Total Treatment Minutes:  55   BWC:  TE TIME:  NMR TIME:  MANUAL TIME:  UNTIMED MINUTES:  Medicare Total:                 [] EVAL (LOW) 41366 (typically 20 minutes face-to-face)  [] EVAL (MOD) 57578 (typically 30 minutes face-to-face)  [] EVAL (HIGH) 01566 (typically 45 minutes face-to-face)  [] RE-EVAL     [x] RX(62919) x 2    [] IONTO  [x] NMR (17067) x 1     [] VASO  [x] Manual (63426) x 1    [] Other:  [] TA x      [] Mech Traction (32970)  [] ES(attended) (00810)     [] ES (un) (25557):    ASSESSMENT:    Pt tolerated session well. He had not been in due to being ill. Improved ROM and strength this visit. Patient will benefit from continued skilled intervention to increase ROM, flexibility, strength and function. GOALS:   Short Term Goals: To be achieved in: 2 weeks  1. Independent in HEP and progression per patient tolerance, in order to prevent re-injury. [] Progressing: [x] Met: [] Not Met: [] Adjusted       2. Patient will have a decrease in pain to facilitate improvement in movement, function, and ADLs as indicated by Functional Deficits. [] Progressing: [x] Met: [] Not Met: [] Adjusted          Long Term Goals: To be achieved in: 12 weeks  1. Disability index score of 20% or less for the QuickDash/Oswestry to assist with reaching prior level of function. [] Progressing: [x] Met: [] Not Met: [] Adjusted      2.  Patient will demonstrate increased AROM to equal the opposite side bilaterally to allow for proper joint functioning as indicated by patients Functional Deficits. [x] Progressing: [x] Met: [] Not Met: [] Adjusted       3. Patient will demonstrate an increase in strength to match bilaterally or be within 3lbs on the HHD to allow for proper functional mobility as indicated by patients Functional Deficits. [x] Progressing: [] Met: [] Not Met: [] Adjusted       4. Patient will return to all transfers, work activities, and functional activities without increased symptoms or restriction. [x] Progressing: [] Met: [] Not Met: [] Adjusted       5. Patient will have 0/10 pain with ADL's.  [] Progressing: [x] Met: [] Not Met: [] Adjusted       6. Patient stated goal: Return to work full time with no restrictions ()  [x] Progressing: [] Met: [] Not Met: [] Adjusted          Overall Progression Towards Functional goals/ Treatment Progress Update:  [x] Patient is progressing as expected towards functional goals listed. [] Progression is slowed due to complexities/Impairments listed. [] Progression has been slowed due to co-morbidities. [] Plan just implemented, too soon to assess goals progression <30days   [] Goals require adjustment due to lack of progress  [] Patient is not progressing as expected and requires additional follow up with physician  [] Other    Prognosis for POC: [x] Good [] Fair  [] Poor    Patient requires continued skilled intervention: [x] Yes  [] No    Treatment/Activity Tolerance:  [x] Patient able to complete treatment  [] Patient limited by fatigue  [] Patient limited by pain    [] Patient limited by other medical complications  [] Other:                  PLAN:    [x] Continue per plan of care [] Alter current plan (see comments above)  [] Plan of care initiated [] Hold pending MD visit [] Discharge    Electronically signed by:  Avinash Alfaro PTA;  Jose Cespedes PT,MPT,ATC, cert DN     Note: If patient does not return for scheduled/ recommended follow up visits, this note will serve as a discharge from care along with most recent update on progress.

## 2022-08-09 ENCOUNTER — HOSPITAL ENCOUNTER (OUTPATIENT)
Dept: PHYSICAL THERAPY | Age: 22
Setting detail: THERAPIES SERIES
End: 2022-08-09
Payer: COMMERCIAL

## 2022-08-15 ENCOUNTER — HOSPITAL ENCOUNTER (OUTPATIENT)
Dept: PHYSICAL THERAPY | Age: 22
Setting detail: THERAPIES SERIES
Discharge: HOME | End: 2022-08-15
Payer: COMMERCIAL

## 2022-08-15 PROCEDURE — 97140 MANUAL THERAPY 1/> REGIONS: CPT | Performed by: PHYSICAL THERAPY ASSISTANT

## 2022-08-15 PROCEDURE — 97110 THERAPEUTIC EXERCISES: CPT | Performed by: PHYSICAL THERAPY ASSISTANT

## 2022-08-15 PROCEDURE — 97112 NEUROMUSCULAR REEDUCATION: CPT | Performed by: PHYSICAL THERAPY ASSISTANT

## 2022-08-15 NOTE — FLOWSHEET NOTE
[facility], [address] [address] [address]  Phone: [phone], [phone]  Date: 8/15/2022          Patient Name; :  Rukhsana Clark; 2000   Dx/ICD Code: Diagnosis: Left Shoulder Labrum Repair / Replissage (22)  Treatment Diagnosis: S43.00D       Physician:  Dr. Pineda Members        Total PT Visits: 14     Measures Previous Current   Pain (0-10) 3/10 0-1/10   Disability % Quick Dash:  55% (Sum 35/55) Quick Dash: 14% (Sum 17/55)   FOTO Score     ROM Flexion 90 degrees Flexion 176,     Shouler ER 15 degrees ER T4, IR T4   Strength  Flexion 4+/5     ABD 5/5     ER 5/5, IR 5/5     Specific Functional Improvements & Impressions:  Pearl Kathleen has progressed well in therapy. He is performing his job duties without issue. He notes the only discomfort he has if he bumps his arm into something or if he awakens and is laying on the shoulder. He is able to carry a case of water without discomfort. He does note an intermittent popping with some discomfort. He has a good understanding of his HEP and he will continue with this at home at this time. Plan & Recommendations:  [] Continue rehabilitation due to objective improvement and continued functional deficits with frequency and duration:   [] Progress toward  []GAP, []Work Conditioning, []Independent HEP   [x] Discharge due to   [x] All goals achieved, [] Maximized \"medical necessity\" [] No subjective or objective improvements      Electronically signed by:  Tk Gould, PTA; Alexa Toro PT,MPT,ATC, cert DN     Therapy Plan of Care Re-Certification  This patient has been re-evaluated for physical therapy services and for therapy to continue, Medicare, Medicaid and other insurances require periodic physician review of the treatment plan.  Please review the above re-evaluation and verify that you agree with plan of care as established above by signing the attached document and return it to our office or note changes to established plan below  [] Follow treatment plan as above [] Discontinue physical therapy  [] Change plan to:                                 __________________________________________________    Physician Signature:____________________________________ Date:____________  By signing above, therapists plan is approved by physician    If you have any questions or concerns, please don't hesitate to call.   Thank you for your referral.    [address] office  ([phone])     Fax [phone]       Physical Therapy Treatment Note/ Progress Report:     Date:  8/15/2022    Patient Name:  Leydi Zabala    :  2000  MRN: [id number]  Restrictions/Precautions:    Medical/Treatment Diagnosis Information:  Diagnosis: Left Shoulder Labrum Repair / Replissage (22)  Treatment Diagnosis: G72.22N  Insurance/Certification information:  PT Insurance Information: Ruthann  Physician Information:  Referring Practitioner: Dr. Kevin Modi  Has the plan of care been signed (Y/N):        [x]  Yes  []  No     Date of Patient follow up with Physician: 22    Is this a Progress Report:     [x]  Yes  []  No      If Yes:  Date Range for reporting period:  Initial Eval: 2022  Beginnin2022 --- Endin22  Beginnin2022 ---- Endin22  Beginnin2022 ---- Endin22  Beginnin2022 --- Endin2022  Beginnin22 ---- Ending 22  Beginnin22 --- Endin22        Progress report will be due (10 Rx or 30 days whichever is less): 25    Recertification will be due (POC Duration  / 90 days whichever is less): 22      Visit # Insurance Allowable Auth Required   In Person 15 27 (does not need auth) []  Yes     [x]  No    [facility] 0  []  Yes     []  No    Total 14       Functional Scale: Quick Dash/Modified Oswestry: 55% (Total Number Sum: 35)   Date assessed: 2022   Functional Scale: Quick Dash/Modified Oswestry: 35%       Date assessed: 4/7/2022  Functional Scale:  Quick Dash:  14% (Total 17/35)       Date:  5/23/2022  Functional Scale: Quick Dash: 16% (total 18/55)       7/5/22  Fucntional Scale:  Katy Mayco: 14% (Totatl 17/55)      8/2/00    Latex Allergy:  [x]NO      []YES  Preferred Language for Healthcare:   [x]English       []other:    Pain level:  0-1/10     SUBJECTIVE:  (Patient is 12 weeks post op on 5/12/2022) IE more than two cases of water. At work good unless he bumps his shoulder onto something. Feels much better than initially when he started therapy. If he sleeps on the shoulder he notices some soreness. OBJECTIVE:   8/2/2022  Flexion 176, , IR T4, ER T4   Strength:  Flexion 4+/5, ABD 5/5, IR 5/5, ER 5/5     5/23/2022 Flexion 164, , IR T5, ER T3   Flexion   Observation: overall hypomobile GH mobs  Test measurements:   AROM SFLX 160  SABD 130    SER @ side 70   SIR behind back T10  MMT  FLX/IR/ER 4 to 4+/5       RESTRICTIONS/PRECAUTIONS: Frequent hx of seizures     **Protocal in media file**  EXERCISES FOR WEEKS 7-9 ? Exercises/Interventions:   Therapeutic Ex (87988)  NMR re-education (29316) Sets/Reps Notes/CUES   Pulley 5 min         Ball roll up wall 2# x20    Functional Lift 4# x20    Supine cane scaption 10 x 10\" HEP   Supine cane ER  Supine shoulder flexion  Supine punch  Side lying ER  Side lying abd 10 x 10\"  3# x20  3# x30  2# x20  2# x20 HEP ? TB rows w scap set BTB 3\"x30 Difficulty w scap set - HEP   TB ext w scap set BTB 3\" x30 difficutly w scap set - HEP   TB ER/IR  GTB x20 ea HEP        PRE flexion/scaption x20 ea  1#   Wall push ups x30    Ball on wall x30 ea ??, ??, CW/CCW green plyo             Prone Row / Extension  2# x20 / 2# x20    Prone Hor ABD  x20              UT stretch  3x30\"    Levator stretch  3x30\"                             Pt ed x5' Review and update HEP, progression and expectations.  Provided instruction in shoulder safe zone [facility]. Manual Intervention (87712)          GI-II distraction/oscillation/xcircumduction for relaxation and pain control x8'    PROM IR/ER/FLX 8'                   Access Code [email or web address] HEP                    Patient Education             Therapeutic Exercise and NMR EXR  [x] (80437) Provided verbal/tactile cueing for activities related to strengthening, flexibility, endurance, ROM  for improvements in scapular, scapulothoracic and UE control with self care, reaching, carrying, lifting, house/yardwork, driving/computer work. [x] (65228) Provided verbal/tactile cueing for activities related to improving balance, coordination, kinesthetic sense, posture, motor skill, proprioception  to assist with  scapular, scapulothoracic and UE control with self care, reaching, carrying, lifting, house/yardwork, driving/computer work. Therapeutic Activities:    [] (21607 or 69240) Provided verbal/tactile cueing for activities related to improving balance, coordination, kinesthetic sense, posture, motor skill, proprioception and motor activation to allow for proper function of scapular, scapulothoracic and UE control with self care, carrying, lifting, driving/computer work.      Home Exercise Program:    [x] (41637) Reviewed/Progressed HEP activities related to strengthening, flexibility, endurance, ROM of scapular, scapulothoracic and UE control with self care, reaching, carrying, lifting, house/yardwork, driving/computer work  [x] (57311) Reviewed/Progressed HEP activities related to improving balance, coordination, kinesthetic sense, posture, motor skill, proprioception of scapular, scapulothoracic and UE control with self care, reaching, carrying, lifting, house/yardwork, driving/computer work      Manual Treatments:  PROM / STM / Oscillations-Mobs:  G-I, II, III, IV (PA's, Inf., Post.)  [x] (88870) Provided manual therapy to mobilize soft tissue/joints of cervical/CT, scapular GHJ and UE for the

## 2022-08-16 ENCOUNTER — HOSPITAL ENCOUNTER (OUTPATIENT)
Dept: PHYSICAL THERAPY | Age: 22
Setting detail: THERAPIES SERIES
End: 2022-08-16
Payer: COMMERCIAL

## 2025-04-11 ENCOUNTER — HOSPITAL ENCOUNTER (OUTPATIENT)
Dept: GENERAL RADIOLOGY | Age: 25
Discharge: HOME | End: 2025-04-11
Payer: COMMERCIAL

## 2025-04-11 ENCOUNTER — HOSPITAL ENCOUNTER (OUTPATIENT)
Age: 25
Discharge: HOME | End: 2025-04-11
Payer: COMMERCIAL

## 2025-04-11 DIAGNOSIS — M25.511 PAIN IN JOINT OF RIGHT SHOULDER: ICD-10-CM

## 2025-04-11 PROCEDURE — 73030 X-RAY EXAM OF SHOULDER: CPT

## 2025-07-22 ENCOUNTER — HOSPITAL ENCOUNTER (OUTPATIENT)
Dept: GENERAL RADIOLOGY | Age: 25
Discharge: HOME | End: 2025-07-22
Payer: COMMERCIAL

## 2025-07-22 DIAGNOSIS — G89.29 CHRONIC RIGHT SHOULDER PAIN: ICD-10-CM

## 2025-07-22 DIAGNOSIS — M25.511 CHRONIC RIGHT SHOULDER PAIN: ICD-10-CM

## 2025-07-22 PROCEDURE — 73030 X-RAY EXAM OF SHOULDER: CPT
